# Patient Record
Sex: FEMALE | Race: WHITE | Employment: FULL TIME | URBAN - METROPOLITAN AREA
[De-identification: names, ages, dates, MRNs, and addresses within clinical notes are randomized per-mention and may not be internally consistent; named-entity substitution may affect disease eponyms.]

---

## 2017-08-14 ENCOUNTER — HOSPITAL ENCOUNTER (EMERGENCY)
Facility: CLINIC | Age: 22
Discharge: HOME | End: 2017-08-14
Attending: EMERGENCY MEDICINE | Admitting: EMERGENCY MEDICINE
Payer: COMMERCIAL

## 2017-08-14 VITALS
OXYGEN SATURATION: 97 % | BODY MASS INDEX: 30.73 KG/M2 | TEMPERATURE: 98.1 F | RESPIRATION RATE: 18 BRPM | DIASTOLIC BLOOD PRESSURE: 73 MMHG | HEART RATE: 87 BPM | HEIGHT: 64 IN | WEIGHT: 180 LBS | SYSTOLIC BLOOD PRESSURE: 113 MMHG

## 2017-08-14 DIAGNOSIS — R07.0 THROAT PAIN: ICD-10-CM

## 2017-08-14 PROCEDURE — 25000131 ZZH RX MED GY IP 250 OP 636 PS 637: Performed by: EMERGENCY MEDICINE

## 2017-08-14 PROCEDURE — 99283 EMERGENCY DEPT VISIT LOW MDM: CPT

## 2017-08-14 RX ADMIN — DEXAMETHASONE 10 MG: 2 TABLET ORAL at 01:35

## 2017-08-14 ASSESSMENT — ENCOUNTER SYMPTOMS
SHORTNESS OF BREATH: 1
TROUBLE SWALLOWING: 1

## 2017-08-14 NOTE — ED PROVIDER NOTES
"  History     Chief Complaint:  Shortness of Breath      The history is provided by the patient.      Corry Souza is a 22 year old female who presents with shortness of breath. The patient states that she woke up at midnight feeling short of breath. She states that she has a known peanut allergy but denies ingesting any peanuts or having any known exposure to peanuts. The patient did take 2 teaspoons of children's benadryl and does feel improved. She notes that it does feel difficult to swallow. The patient denies any new exposures or rashes.      Allergies:  No known drug allergies.      Medications:    Levothyroxine Sodium   Ocella  Tylenol  Ibuprofen      Past Medical History:    History reviewed.  No significant past medical history.      Past Surgical History:    Orthopedic surgery   PE tubes   Left ankle arthroscopy     Family History:    History reviewed. No pertinent family history.     Social History:  Marital Status: Single  Presents to the ED alone  Tobacco Use: Never Used  Alcohol Use: No     Review of Systems   HENT: Positive for trouble swallowing.    Respiratory: Positive for shortness of breath.    All other systems reviewed and are negative.    Physical Exam   First Vitals:  Pulse: 102  Temp: 98.1  F (36.7  C)  Resp: 18  Height: 162.6 cm (5' 4\")  Weight: 81.6 kg (180 lb)  SpO2: 100 %      Physical Exam  Constitutional: Patient appears well-developed and well-nourished. There is no acute distress.   HENT:   Head: No external signs of trauma. No lesions noted.  Eyes: PEERL, EOMI B/L, no pain or limitation of superior gaze  Ears: Normal B/L TM and external canals  Nose: Noncongested, no exudates. No rhinorrhea. No FB noted  Mouth/Throat:                         Mucous membranes are moist and normal.                         No Oropharyngeal exudate. No oropharyngeal erythema noted.                        No tonsilar swelling noted.                         No uvular deviation noted.                     "    No swelling noted on the floor of the mouth  Neck:  No posterior cervical LAD noted. FROM. Neck is supple  Cardiovascular: Normal rate at the time of my exam, regular rhythm and normal heart sounds.  Exam reveals no gallop and no friction rub.  No murmur heard. Normal peripheral pulses.  Pulmonary/Chest: Effort normal and breath sounds normal. No respiratory distress. Patient has no wheezes. Patient has no rales.   Abdominal: Soft. There is no tenderness.   Extremities: No edema noted  Neurological: Patient is alert and oriented to person, place, and time.   Skin: Skin is warm and dry. There is no diaphoresis noted. No rash noted.    Emergency Department Course   Interventions:  Decadron, 10 mg, PO     Emergency Department Course:  Nursing notes and vitals reviewed.  (0121) I performed an exam of the patient as documented above.  (0235) I rechecked the patient. Repeat exam shows no wheezing, stridor, or airway swelling. Patient reports improved throat pain as well. No signs of hives.    Findings and plan explained to the patient. Patient discharged home with instructions regarding supportive care, medications, and reasons to return. The importance of close follow-up was reviewed.    Impression & Plan    Medical Decision Making:  Corry Souza is a 22 year old female who presents thinking she had an allergic reaction. The patient is allergic to peanuts but denies any exposures. The patient took 2 teaspoons of children's benadryl at home and thought her throat scratchiness felt better. She received a dose of decadron in the ER and had complete resolution of her throat pain. Her ENT exam is unremarkable. I believe there could be an element of viral pharyngitis. I suppose an allergic reaction could be possible but I did not note any other symptoms such as hives that would be concerning for that. At this point I believe the patient is stable for discharge but I have encouraged closed primary care follow up in the  outpatient setting. Anticipatory guidance given prior to discharge.      Diagnosis:    ICD-10-CM    1. Throat pain R07.0        Disposition:  discharged to home        I, Carol Adamson, am serving as a scribe on 8/14/2017 at 1:21 AM to personally document services performed by Dr. Cormier based on my observations and the provider's statements to me.    8/14/2017   [facility] EMERGENCY DEPARTMENT       Yogesh Cormier DO  08/14/17 0249

## 2017-08-14 NOTE — ED AVS SNAPSHOT
[facility] Emergency Department    [address]    [facility] [address]    Phone:  [phone]    Fax:  [phone]                                       Corry Souza   MRN: [id number]    Department:  [facility] Emergency Department   Date of Visit:  8/14/2017           After Visit Summary Signature Page     I have received my discharge instructions, and my questions have been answered. I have discussed any challenges I see with this plan with the nurse or doctor.    ..........................................................................................................................................  Patient/Patient Representative Signature      ..........................................................................................................................................  Patient Representative Print Name and Relationship to Patient    ..................................................               ................................................  Date                                            Time    ..........................................................................................................................................  Reviewed by Signature/Title    ...................................................              ..............................................  Date                                                            Time

## 2017-08-14 NOTE — ED AVS SNAPSHOT
[facility] Emergency Department    [address]    [facility] [address]    Phone:  [phone]    Fax:  [phone]                                       Corry Souza   MRN: [id number]    Department:  [facility] Emergency Department   Date of Visit:  8/14/2017           Patient Information     Date Of Birth          1995        Your diagnoses for this visit were:     Throat pain        You were seen by Yogesh Cormier DO.      Follow-up Information     Follow up with [facility].    Specialties:  Sports Medicine, Pain Management, Obstetrics & Gynecology, Pediatrics, Internal Medicine, Nephrology    Why:  If you need to establish care with a new primary care provider    Contact information:    [address]  [facility] [address] [phone]        Follow up with [facility] Emergency Department.    Specialty:  EMERGENCY MEDICINE    Why:  If symptoms worsen    Contact information:    [address]  [facility] [address] [phone]        Discharge Instructions         Pharyngitis (Sore Throat)    You (or your child, if your child is the patient) have pharyngitis (sore throat). This infection is caused by a virus. It can cause throat pain that is worse when swallowing, aching all over, headache, and fever. The infection may be spread by coughing, kissing, or touching others after touching your mouth or nose. Antibiotic medications do not work against viruses, so they are not used for treating this condition.  Home care    If your symptoms are severe, rest at home. Return to work or school when you feel well enough.     Drink plenty of fluids to avoid dehydration.    For children: Use acetaminophen for fever, fussiness or discomfort. In infants over six months of age, you may use ibuprofen instead of acetaminophen. (NOTE: If your child has chronic liver or kidney disease or ever had  a stomach ulcer or GI bleeding, talk with your doctor before using these medicines.) (NOTE: Aspirin should never be used in anyone under 18 years of age who is ill with a fever. It may cause severe liver damage.)     For adults: You may use acetaminophen or ibuprofen to control pain or fever, unless another medicine was prescribed for this. (NOTE: If you have chronic liver or kidney disease or ever had a stomach ulcer or GI bleeding, talk with your doctor before using these medicines.)    Throat lozenges or numbing throat sprays can help reduce pain. Gargling with warm salt water will also help reduce throat pain. For this, dissolve 1/2 teaspoon of salt in 1 glass of warm water. To help soothe a sore throat, children can sip on juice or a popsicle. Children 5 years and older can also suck on a lollipop or hard candy.    Avoid salty or spicy foods, which can be irritating to the throat.  Follow-up care  Follow up with your healthcare provider or our staff if you are not improving over the next week.  When to seek medical advice  Call your healthcare provider right away if any of these occur:    Fever as directed by your doctor.  For children, seek care if:    Your child is of any age and has repeated fevers above 104 F (40 C).    Your child is younger than 2 years of age and has a fever of 100.4 F (38 C) that continues for more than 1 day.    Your child is 2 years old or older and has a fever of 100.4 F (38 C) that continues for more than 3 days.    New or worsening ear pain, sinus pain, or headache    Painful lumps in the back of neck    Stiff neck    Lymph nodes are getting larger    Inability to swallow liquids, excessive drooling, or inability to open mouth wide due to throat pain    Signs of dehydration (very dark urine or no urine, sunken eyes, dizziness)    Trouble breathing or noisy breathing    Muffled voice    New rash    Child appears to be getting sicker  Date Last Reviewed: 4/13/2015 2000-2017 [employer]. [address] [place] [address]. All rights reserved. This information is not intended as a substitute for professional medical care. Always follow your healthcare professional's instructions.          24 Hour Appointment Hotline       To make an appointment at any [facility], call [phone] ([phone]). If you don't have a family doctor or clinic, we will help you find one. [facility] are conveniently located to serve the needs of you and your family.             Review of your medicines      Our records show that you are taking the medicines listed below. If these are incorrect, please call your family doctor or clinic.        Dose / Directions Last dose taken    acetaminophen 325 MG tablet   Commonly known as:  TYLENOL   Dose:  1-2 tablet   Quantity:  250 tablet        Take 1-2 tablets by mouth every 6 hours as needed for pain.   Refills:  11        drospirenone-ethinyl estradiol 3-0.03 MG per tablet   Commonly known as:  OCELLA   Dose:  1 tablet   Quantity:  3 Package        Take 1 tablet by mouth daily.   Refills:  1        ibuprofen 200 MG tablet   Commonly known as:  ADVIL/MOTRIN   Dose:  200 mg   Quantity:  100 tablet        Take 1 tablet by mouth every 4 hours as needed for pain.   Refills:  3        LEVOTHYROXINE SODIUM PO        Refills:  0                Orders Needing Specimen Collection     None      Pending Results     No orders found from 8/12/2017 to 8/15/2017.            Pending Culture Results     No orders found from 8/12/2017 to 8/15/2017.            Pending Results Instructions     If you had any lab results that were not finalized at the time of your Discharge, you can call the ED Lab Result RN at [phone]. You will be contacted by this team for any positive Lab results or changes in treatment. The nurses are available 7 days a week from 10A to 6:30P.  You can leave a message 24 hours per day and they will return your call.        Test  Results From Your Hospital Stay               Clinical Quality Measure: Blood Pressure Screening     Your blood pressure was checked while you were in the emergency department today. The last reading we obtained was  BP: 114/69 . Please read the guidelines below about what these numbers mean and what you should do about them.  If your systolic blood pressure (the top number) is less than 120 and your diastolic blood pressure (the bottom number) is less than 80, then your blood pressure is normal. There is nothing more that you need to do about it.  If your systolic blood pressure (the top number) is 120-139 or your diastolic blood pressure (the bottom number) is 80-89, your blood pressure may be higher than it should be. You should have your blood pressure rechecked within a year by a primary care provider.  If your systolic blood pressure (the top number) is 140 or greater or your diastolic blood pressure (the bottom number) is 90 or greater, you may have high blood pressure. High blood pressure is treatable, but if left untreated over time it can put you at risk for heart attack, stroke, or kidney failure. You should have your blood pressure rechecked by a primary care provider within the next 4 weeks.  If your provider in the emergency department today gave you specific instructions to follow-up with your doctor or provider even sooner than that, you should follow that instruction and not wait for up to 4 weeks for your follow-up visit.        Thank you for choosing [facility]       Thank you for choosing [facility] for your care. Our goal is always to provide you with excellent care. Hearing back from our patients is one way we can continue to improve our services. Please take a few minutes to complete the written survey that you may receive in the mail after you visit with us. Thank you!        [employer] Information     [employer] lets you send messages to your doctor, view your test results, renew your prescriptions,  "schedule appointments and more. To sign up, go to [email or web address] . Click on \"Log in\" on the left side of the screen, which will take you to the Welcome page. Then click on \"Sign up Now\" on the right side of the page.     You will be asked to enter the access code listed below, as well as some personal information. Please follow the directions to create your username and password.     Your access code is: [email or web address]  Expires: 2017  1:31 AM     Your access code will  in 90 days. If you need help or a new code, please call your [facility] clinic or [phone].        Care EveryWhere ID     This is your Care EveryWhere ID. This could be used by other organizations to access your [facility] medical records  [id number]        Equal Access to Services     JESI ANDREWS : Hadtino Munoz, wavaishali roberto, xuan mercado, joie harrison . So [facility] [phone].    ATENCIÓN: Si habla español, tiene a beckett disposición servicios gratuitos de asistencia lingüística. Llame al [phone].    We comply with applicable federal civil rights laws and [place] laws. We do not discriminate on the basis of race, color, national origin, age, disability sex, sexual orientation or gender identity.            After Visit Summary       This is your record. Keep this with you and show to your community pharmacist(s) and doctor(s) at your next visit.                  "

## 2017-08-14 NOTE — ED NOTES
Pt was sleeping and awakened with a sense of SOB pt also felt like it was difff to swallow, pt has a peanut allergy but denies any intake, took  2 tsp of childrens benadryl

## 2017-08-14 NOTE — DISCHARGE INSTRUCTIONS
Pharyngitis (Sore Throat)    You (or your child, if your child is the patient) have pharyngitis (sore throat). This infection is caused by a virus. It can cause throat pain that is worse when swallowing, aching all over, headache, and fever. The infection may be spread by coughing, kissing, or touching others after touching your mouth or nose. Antibiotic medications do not work against viruses, so they are not used for treating this condition.  Home care    If your symptoms are severe, rest at home. Return to work or school when you feel well enough.     Drink plenty of fluids to avoid dehydration.    For children: Use acetaminophen for fever, fussiness or discomfort. In infants over six months of age, you may use ibuprofen instead of acetaminophen. (NOTE: If your child has chronic liver or kidney disease or ever had a stomach ulcer or GI bleeding, talk with your doctor before using these medicines.) (NOTE: Aspirin should never be used in anyone under 18 years of age who is ill with a fever. It may cause severe liver damage.)     For adults: You may use acetaminophen or ibuprofen to control pain or fever, unless another medicine was prescribed for this. (NOTE: If you have chronic liver or kidney disease or ever had a stomach ulcer or GI bleeding, talk with your doctor before using these medicines.)    Throat lozenges or numbing throat sprays can help reduce pain. Gargling with warm salt water will also help reduce throat pain. For this, dissolve 1/2 teaspoon of salt in 1 glass of warm water. To help soothe a sore throat, children can sip on juice or a popsicle. Children 5 years and older can also suck on a lollipop or hard candy.    Avoid salty or spicy foods, which can be irritating to the throat.  Follow-up care  Follow up with your healthcare provider or our staff if you are not improving over the next week.  When to seek medical advice  Call your healthcare provider right away if any of these  occur:    Fever as directed by your doctor.  For children, seek care if:    Your child is of any age and has repeated fevers above 104 F (40 C).    Your child is younger than 2 years of age and has a fever of 100.4 F (38 C) that continues for more than 1 day.    Your child is 2 years old or older and has a fever of 100.4 F (38 C) that continues for more than 3 days.    New or worsening ear pain, sinus pain, or headache    Painful lumps in the back of neck    Stiff neck    Lymph nodes are getting larger    Inability to swallow liquids, excessive drooling, or inability to open mouth wide due to throat pain    Signs of dehydration (very dark urine or no urine, sunken eyes, dizziness)    Trouble breathing or noisy breathing    Muffled voice    New rash    Child appears to be getting sicker  Date Last Reviewed: 4/13/2015 2000-2017 [employer]. [address] [place] [address], [place] [address]. All rights reserved. This information is not intended as a substitute for professional medical care. Always follow your healthcare professional's instructions.

## 2017-08-14 NOTE — ED NOTES
Pt stated still hard to swallow but feels improved, now c/o dizziness otherwise has no other complaints, VS remains stable, will continue to monitor.

## 2018-09-23 ENCOUNTER — HOSPITAL ENCOUNTER (EMERGENCY)
Facility: CLINIC | Age: 23
Discharge: HOME | End: 2018-09-24
Attending: EMERGENCY MEDICINE | Admitting: EMERGENCY MEDICINE

## 2018-09-23 DIAGNOSIS — R19.7 DIARRHEA OF PRESUMED INFECTIOUS ORIGIN: ICD-10-CM

## 2018-09-23 LAB
ALBUMIN SERPL-MCNC: 4.2 G/DL (ref 3.4–5)
ALP SERPL-CCNC: 59 U/L (ref 40–150)
ALT SERPL W P-5'-P-CCNC: 26 U/L (ref 0–50)
ANION GAP SERPL CALCULATED.3IONS-SCNC: 6 MMOL/L (ref 3–14)
AST SERPL W P-5'-P-CCNC: 18 U/L (ref 0–45)
BASOPHILS # BLD AUTO: 0.1 10E9/L (ref 0–0.2)
BASOPHILS NFR BLD AUTO: 0.5 %
BILIRUB SERPL-MCNC: 0.2 MG/DL (ref 0.2–1.3)
BUN SERPL-MCNC: 12 MG/DL (ref 7–30)
CALCIUM SERPL-MCNC: 8.9 MG/DL (ref 8.5–10.1)
CHLORIDE SERPL-SCNC: 107 MMOL/L (ref 94–109)
CO2 SERPL-SCNC: 28 MMOL/L (ref 20–32)
CREAT SERPL-MCNC: 1.01 MG/DL (ref 0.52–1.04)
DIFFERENTIAL METHOD BLD: NORMAL
EOSINOPHIL # BLD AUTO: 0.1 10E9/L (ref 0–0.7)
EOSINOPHIL NFR BLD AUTO: 1.3 %
ERYTHROCYTE [DISTWIDTH] IN BLOOD BY AUTOMATED COUNT: 12.3 % (ref 10–15)
GFR SERPL CREATININE-BSD FRML MDRD: 66 ML/MIN/1.7M2
GLUCOSE SERPL-MCNC: 119 MG/DL (ref 70–99)
HCG SERPL QL: NEGATIVE
HCT VFR BLD AUTO: 40.2 % (ref 35–47)
HEMOCCULT STL QL: POSITIVE
HGB BLD-MCNC: 13.6 G/DL (ref 11.7–15.7)
IMM GRANULOCYTES # BLD: 0 10E9/L (ref 0–0.4)
IMM GRANULOCYTES NFR BLD: 0.3 %
LACTOFERRIN STL QL IA: POSITIVE
LYMPHOCYTES # BLD AUTO: 3.3 10E9/L (ref 0.8–5.3)
LYMPHOCYTES NFR BLD AUTO: 34.6 %
MCH RBC QN AUTO: 30.7 PG (ref 26.5–33)
MCHC RBC AUTO-ENTMCNC: 33.8 G/DL (ref 31.5–36.5)
MCV RBC AUTO: 91 FL (ref 78–100)
MONOCYTES # BLD AUTO: 0.7 10E9/L (ref 0–1.3)
MONOCYTES NFR BLD AUTO: 6.9 %
NEUTROPHILS # BLD AUTO: 5.4 10E9/L (ref 1.6–8.3)
NEUTROPHILS NFR BLD AUTO: 56.4 %
NRBC # BLD AUTO: 0 10*3/UL
NRBC BLD AUTO-RTO: 0 /100
PLATELET # BLD AUTO: 242 10E9/L (ref 150–450)
POTASSIUM SERPL-SCNC: 3.6 MMOL/L (ref 3.4–5.3)
PROT SERPL-MCNC: 7.8 G/DL (ref 6.8–8.8)
RBC # BLD AUTO: 4.43 10E12/L (ref 3.8–5.2)
SODIUM SERPL-SCNC: 141 MMOL/L (ref 133–144)
WBC # BLD AUTO: 9.5 10E9/L (ref 4–11)

## 2018-09-23 PROCEDURE — 87506 IADNA-DNA/RNA PROBE TQ 6-11: CPT | Performed by: EMERGENCY MEDICINE

## 2018-09-23 PROCEDURE — 80053 COMPREHEN METABOLIC PANEL: CPT | Performed by: EMERGENCY MEDICINE

## 2018-09-23 PROCEDURE — 82272 OCCULT BLD FECES 1-3 TESTS: CPT | Performed by: EMERGENCY MEDICINE

## 2018-09-23 PROCEDURE — 96375 TX/PRO/DX INJ NEW DRUG ADDON: CPT

## 2018-09-23 PROCEDURE — 83630 LACTOFERRIN FECAL (QUAL): CPT | Performed by: EMERGENCY MEDICINE

## 2018-09-23 PROCEDURE — 99284 EMERGENCY DEPT VISIT MOD MDM: CPT | Mod: 25

## 2018-09-23 PROCEDURE — 87493 C DIFF AMPLIFIED PROBE: CPT | Performed by: EMERGENCY MEDICINE

## 2018-09-23 PROCEDURE — 85025 COMPLETE CBC W/AUTO DIFF WBC: CPT | Performed by: EMERGENCY MEDICINE

## 2018-09-23 PROCEDURE — 84703 CHORIONIC GONADOTROPIN ASSAY: CPT | Performed by: EMERGENCY MEDICINE

## 2018-09-23 PROCEDURE — 25000128 H RX IP 250 OP 636: Performed by: EMERGENCY MEDICINE

## 2018-09-23 PROCEDURE — 96361 HYDRATE IV INFUSION ADD-ON: CPT

## 2018-09-23 PROCEDURE — 96374 THER/PROPH/DIAG INJ IV PUSH: CPT

## 2018-09-23 RX ORDER — SODIUM CHLORIDE 9 MG/ML
1000 INJECTION, SOLUTION INTRAVENOUS CONTINUOUS
Status: DISCONTINUED | OUTPATIENT
Start: 2018-09-23 | End: 2018-09-23

## 2018-09-23 RX ORDER — KETOROLAC TROMETHAMINE 15 MG/ML
15 INJECTION, SOLUTION INTRAMUSCULAR; INTRAVENOUS ONCE
Status: COMPLETED | OUTPATIENT
Start: 2018-09-23 | End: 2018-09-23

## 2018-09-23 RX ORDER — ONDANSETRON 2 MG/ML
4 INJECTION INTRAMUSCULAR; INTRAVENOUS
Status: COMPLETED | OUTPATIENT
Start: 2018-09-23 | End: 2018-09-23

## 2018-09-23 RX ADMIN — SODIUM CHLORIDE, POTASSIUM CHLORIDE, SODIUM LACTATE AND CALCIUM CHLORIDE 1000 ML: 600; 310; 30; 20 INJECTION, SOLUTION INTRAVENOUS at 23:40

## 2018-09-23 RX ADMIN — KETOROLAC TROMETHAMINE 15 MG: 15 INJECTION, SOLUTION INTRAMUSCULAR; INTRAVENOUS at 22:39

## 2018-09-23 RX ADMIN — ONDANSETRON 4 MG: 2 INJECTION INTRAMUSCULAR; INTRAVENOUS at 22:39

## 2018-09-23 RX ADMIN — SODIUM CHLORIDE, POTASSIUM CHLORIDE, SODIUM LACTATE AND CALCIUM CHLORIDE 1000 ML: 600; 310; 30; 20 INJECTION, SOLUTION INTRAVENOUS at 22:37

## 2018-09-23 NOTE — ED AVS SNAPSHOT
[facility] Emergency Department    [address]    [facility] [address]    Phone:  [phone]    Fax:  [phone]                                       Corry Souza   MRN: [id number]    Department:  [facility] Emergency Department   Date of Visit:  9/23/2018           After Visit Summary Signature Page     I have received my discharge instructions, and my questions have been answered. I have discussed any challenges I see with this plan with the nurse or doctor.    ..........................................................................................................................................  Patient/Patient Representative Signature      ..........................................................................................................................................  Patient Representative Print Name and Relationship to Patient    ..................................................               ................................................  Date                                   Time    ..........................................................................................................................................  Reviewed by Signature/Title    ...................................................              ..............................................  Date                                               Time          22EPIC Rev 08/18

## 2018-09-23 NOTE — ED AVS SNAPSHOT
[facility] Emergency Department    [address] [place] [address]    Phone:  [phone]    Fax:  [phone]                                       Corry Souza   MRN: [id number]    Department:  [facility] Emergency Department   Date of Visit:  9/23/2018           Patient Information     Date Of Birth          1995        Your diagnoses for this visit were:     Diarrhea of presumed infectious origin        You were seen by Bautista Nettles MD.      Follow-up Information     Follow up with Norma Butler NP In 2 days.    Specialty:  Family Practice    Contact information:    [facility]  [address] [facility] [id number] [phone]          Discharge Instructions       Discharge Instructions  Adult Diarrhea    You have been seen today for diarrhea. This is usually caused by a virus, but some bacteria, parasites, medicines or other medical conditions can cause similar symptoms. At this time your doctor does not find that your diarrhea is a sign of anything dangerous or life-threatening. However, sometimes the signs of serious illness do not show up right away. If you have new or worse symptoms, you may need to be seen again in the Emergency Department or by your primary doctor.     Return to the Emergency Department if:    You feel you are getting dehydrated, such as being very thirsty, not urinating at least every 8-12 hours, or feeling faint or lightheaded.     You develop a new fever, or your fever continues for more than 2 days.     You have belly pain that seems worse than cramps, is in one spot, or is getting worse over time.     You have blood in your stool or your stool becomes black.  (Remember that if you take Pepto-Bismol , this will turn your stool black).     You feel very weak.    You are not starting to improve within 24 hours of your visit here.    What can I do to help myself?    The most important thing to do is to drink clear  "liquids.   It is best to have only small, frequent sips of liquids. Drinking too much at once may cause more diarrhea. You should also replace minerals, sodium and potassium lost with diarrhea. Pedialyte  and sports drinks can help you replace these minerals. You can also drink clear liquids such as water, weak tea, apple juice, and 7-Up . Avoid acid liquids (orange), caffeine (coffee) or alcohol. Milk products will make the diarrhea worse.     Eat only bland foods. Soda crackers, toast, plain noodles, gelatin, applesauce and bananas are good first choices. Avoid foods that have acid, are spicy, fatty or fibrous (such as meats, coarse grains, vegetables). You may start eating these foods again in about 3 days when you are better.     Sometimes treatment includes prescription medicine to prevent diarrhea. If your doctor prescribes these for you, take them as directed.     Nonprescription medicine is available for the treatment of diarrhea and can be very effective. If you use it, make sure you use the dose recommended on the package. Check with your healthcare provider before you use any medicine for diarrhea.     Don t take ibuprofen, or other nonsteroidal anti-inflammatory medicines without checking with your healthcare provider.   Probiotics: If you have been given an antibiotic, you may want to also take a probiotic pill or eat yogurt with live cultures. Probiotics have \"good bacteria\" to help your intestines stay healthy. Studies have shown that probiotics help prevent diarrhea and other intestine problems (including C. diff infection) when you take antibiotics. You can buy these without a prescription in the pharmacy section of the store.   If you were given a prescription for medicine here today, be sure to read all of the information (including the package insert) that comes with your prescription.  This will include important information about the medicine, its side effects, and any warnings that you need to " know about.  The pharmacist who fills the prescription can provide more information and answer questions you may have about the medicine.  If you have questions or concerns that the pharmacist cannot address, please call or return to the Emergency Department.   Opioid Medication Information    Pain medications are among the most commonly prescribed medicines, so we are including this information for all our patients. If you did not receive pain medication or get a prescription for pain medicine, you can ignore it.     You may have been given a prescription for an opioid (narcotic) pain medicine and/or have received a pain medicine while here in the Emergency Department. These medicines can make you drowsy or impaired. You must not drive, operate dangerous equipment, or engage in any other dangerous activities while taking these medications. If you drive while taking these medications, you could be arrested for DUI, or driving under the influence. Do not drink any alcohol while you are taking these medications.     Opioid pain medications can cause addiction. If you have a history of chemical dependency of any type, you are at a higher risk of becoming addicted to pain medications.  Only take these prescribed medications to treat your pain when all other options have been tried. Take it for as short a time and as few doses as possible. Store your pain pills in a secure place, as they are frequently stolen and provide a dangerous opportunity for children or visitors in your house to start abusing these powerful medications. We will not replace any lost or stolen medicine.  As soon as your pain is better, you should flush all your remaining medication.     Many prescription pain medications contain Tylenol  (acetaminophen), including Vicodin , Tylenol #3 , Norco , Lortab , and Percocet .  You should not take any extra pills of Tylenol  if you are using these prescription medications or you can get very sick.  Do not  ever take more than 3000 mg of acetaminophen in any 24 hour period.    All opioids tend to cause constipation. Drink plenty of water and eat foods that have a lot of fiber, such as fruits, vegetables, prune juice, apple juice and high fiber cereal.  Take a laxative if you don t move your bowels at least every other day. Miralax , Milk of Magnesia, Colace , or Senna  can be used to keep you regular.      Remember that you can always come back to the Emergency Department if you are not able to see your regular doctor in the amount of time listed above, if you get any new symptoms, or if there is anything that worries you.        24 Hour Appointment Hotline       To make an appointment at any [facility], call [phone] ([phone]). If you don't have a family doctor or clinic, we will help you find one. [facility] clinics are conveniently located to serve the needs of you and your family.             Review of your medicines      Our records show that you are taking the medicines listed below. If these are incorrect, please call your family doctor or clinic.        Dose / Directions Last dose taken    acetaminophen 325 MG tablet   Commonly known as:  TYLENOL   Dose:  1-2 tablet   Quantity:  250 tablet        Take 1-2 tablets by mouth every 6 hours as needed for pain.   Refills:  11        drospirenone-ethinyl estradiol 3-0.03 MG per tablet   Commonly known as:  OCELLA   Dose:  1 tablet   Quantity:  3 Package        Take 1 tablet by mouth daily.   Refills:  1        ibuprofen 200 MG tablet   Commonly known as:  ADVIL/MOTRIN   Dose:  200 mg   Quantity:  100 tablet        Take 1 tablet by mouth every 4 hours as needed for pain.   Refills:  3        LEVOTHYROXINE SODIUM PO        Refills:  0                Procedures and tests performed during your visit     CBC with platelets differential    Clostridium difficile toxin B PCR    Comprehensive metabolic panel    Enteric Bacteria and Virus Panel by ELIESER Stool     Fecal Lactoferrin    HCG qualitative    Occult blood stool      Orders Needing Specimen Collection     None      Pending Results     Date and Time Order Name Status Description    9/23/2018 2231 Clostridium difficile toxin B PCR In process     9/23/2018 2231 Enteric Bacteria and Virus Panel by ELIESER Stool In process             Pending Culture Results     Date and Time Order Name Status Description    9/23/2018 2231 Clostridium difficile toxin B PCR In process     9/23/2018 2231 Enteric Bacteria and Virus Panel by ELIESER Stool In process             Pending Results Instructions     If you had any lab results that were not finalized at the time of your Discharge, you can call the ED Lab Result RN at [phone]. You will be contacted by this team for any positive Lab results or changes in treatment. The nurses are available 7 days a week from 10A to 6:30P.  You can leave a message 24 hours per day and they will return your call.        Test Results From Your Hospital Stay        9/23/2018 10:21 PM      Component Results     Component Value Ref Range & Units Status    WBC 9.5 4.0 - 11.0 10e9/L Final    RBC Count 4.43 3.8 - 5.2 10e12/L Final    Hemoglobin 13.6 11.7 - 15.7 g/dL Final    Hematocrit 40.2 35.0 - 47.0 % Final    MCV 91 78 - 100 fl Final    MCH 30.7 26.5 - 33.0 pg Final    MCHC 33.8 31.5 - 36.5 g/dL Final    RDW 12.3 10.0 - 15.0 % Final    Platelet Count 242 150 - 450 10e9/L Final    Diff Method Automated Method  Final    % Neutrophils 56.4 % Final    % Lymphocytes 34.6 % Final    % Monocytes 6.9 % Final    % Eosinophils 1.3 % Final    % Basophils 0.5 % Final    % Immature Granulocytes 0.3 % Final    Nucleated RBCs 0 0 /100 Final    Absolute Neutrophil 5.4 1.6 - 8.3 10e9/L Final    Absolute Lymphocytes 3.3 0.8 - 5.3 10e9/L Final    Absolute Monocytes 0.7 0.0 - 1.3 10e9/L Final    Absolute Eosinophils 0.1 0.0 - 0.7 10e9/L Final    Absolute Basophils 0.1 0.0 - 0.2 10e9/L Final    Abs Immature Granulocytes 0.0 0 -  0.4 10e9/L Final    Absolute Nucleated RBC 0.0  Final         9/23/2018 10:38 PM      Component Results     Component Value Ref Range & Units Status    Sodium 141 133 - 144 mmol/L Final    Potassium 3.6 3.4 - 5.3 mmol/L Final    Chloride 107 94 - 109 mmol/L Final    Carbon Dioxide 28 20 - 32 mmol/L Final    Anion Gap 6 3 - 14 mmol/L Final    Glucose 119 (H) 70 - 99 mg/dL Final    Urea Nitrogen 12 7 - 30 mg/dL Final    Creatinine 1.01 0.52 - 1.04 mg/dL Final    GFR Estimate 66 >60 mL/min/1.7m2 Final    Non  GFR Calc    GFR Estimate If Black 80 >60 mL/min/1.7m2 Final    African American GFR Calc    Calcium 8.9 8.5 - 10.1 mg/dL Final    Bilirubin Total 0.2 0.2 - 1.3 mg/dL Final    Albumin 4.2 3.4 - 5.0 g/dL Final    Protein Total 7.8 6.8 - 8.8 g/dL Final    Alkaline Phosphatase 59 40 - 150 U/L Final    ALT 26 0 - 50 U/L Final    AST 18 0 - 45 U/L Final         9/23/2018 11:05 PM      Component Results     Component Value Ref Range & Units Status    Occult Blood Positive (A) NEG^Negative Final         9/23/2018 10:41 PM         9/23/2018 11:04 PM      Component Results     Component Value Ref Range & Units Status    Fecal Lactoferrin Positive (A) NEG^Negative Final    Test not valid for breast fed patients.         9/23/2018 10:41 PM         9/23/2018 10:55 PM      Component Results     Component Value Ref Range & Units Status    HCG Qualitative Serum Negative NEG^Negative Final    This test is for screening purposes.  Results should be interpreted along with   the clinical picture.  Confirmation testing is available if warranted by   ordering QUN803, HCG Quantitative Pregnancy.                  Clinical Quality Measure: Blood Pressure Screening     Your blood pressure was checked while you were in the emergency department today. The last reading we obtained was  BP: 117/73 . Please read the guidelines below about what these numbers mean and what you should do about them.  If your systolic blood pressure  "(the top number) is less than 120 and your diastolic blood pressure (the bottom number) is less than 80, then your blood pressure is normal. There is nothing more that you need to do about it.  If your systolic blood pressure (the top number) is 120-139 or your diastolic blood pressure (the bottom number) is 80-89, your blood pressure may be higher than it should be. You should have your blood pressure rechecked within a year by a primary care provider.  If your systolic blood pressure (the top number) is 140 or greater or your diastolic blood pressure (the bottom number) is 90 or greater, you may have high blood pressure. High blood pressure is treatable, but if left untreated over time it can put you at risk for heart attack, stroke, or kidney failure. You should have your blood pressure rechecked by a primary care provider within the next 4 weeks.  If your provider in the emergency department today gave you specific instructions to follow-up with your doctor or provider even sooner than that, you should follow that instruction and not wait for up to 4 weeks for your follow-up visit.        Thank you for choosing [facility]       Thank you for choosing [facility] for your care. Our goal is always to provide you with excellent care. Hearing back from our patients is one way we can continue to improve our services. Please take a few minutes to complete the written survey that you may receive in the mail after you visit with us. Thank you!        [employer] Information     [employer] lets you send messages to your doctor, view your test results, renew your prescriptions, schedule appointments and more. To sign up, go to [email or web address] . Click on \"Log in\" on the left side of the screen, which will take you to the Welcome page. Then click on \"Sign up Now\" on the right side of the page.     You will be asked to enter the access code listed below, as well as some personal information. Please follow the directions to " create your username and password.     Your access code is: [email or web address]  Expires: 2018 12:28 AM     Your access code will  in 90 days. If you need help or a new code, please call your [facility] clinic or [phone].        Care EveryWhere ID     This is your Care EveryWhere ID. This could be used by other organizations to access your [facility] medical records  [id number]        Equal Access to Services     [facility] : Hadii cesar harmon hadasho Sotitoali, waaxda luqadaha, qaybta kaalmada adeegyada, joie harrison . So [facility] [phone].    ATENCIÓN: Si habla español, tiene a beckett disposición servicios gratuitos de asistencia lingüística. Llame al [phone].    We comply with applicable federal civil rights laws and [place] laws. We do not discriminate on the basis of race, color, national origin, age, disability, sex, sexual orientation, or gender identity.            After Visit Summary       This is your record. Keep this with you and show to your community pharmacist(s) and doctor(s) at your next visit.

## 2018-09-24 VITALS
HEART RATE: 81 BPM | SYSTOLIC BLOOD PRESSURE: 128 MMHG | BODY MASS INDEX: 30.73 KG/M2 | TEMPERATURE: 97.6 F | RESPIRATION RATE: 14 BRPM | WEIGHT: 180 LBS | HEIGHT: 64 IN | OXYGEN SATURATION: 97 % | DIASTOLIC BLOOD PRESSURE: 76 MMHG

## 2018-09-24 LAB
C COLI+JEJUNI+LARI FUSA STL QL NAA+PROBE: NOT DETECTED
C DIFF TOX B STL QL: NEGATIVE
EC STX1 GENE STL QL NAA+PROBE: NOT DETECTED
EC STX2 GENE STL QL NAA+PROBE: NOT DETECTED
ENTERIC PATHOGEN COMMENT: NORMAL
NOROV GI+II ORF1-ORF2 JNC STL QL NAA+PR: NOT DETECTED
RVA NSP5 STL QL NAA+PROBE: NOT DETECTED
SALMONELLA SP RPOD STL QL NAA+PROBE: NOT DETECTED
SHIGELLA SP+EIEC IPAH STL QL NAA+PROBE: NOT DETECTED
SPECIMEN SOURCE: NORMAL
V CHOL+PARA RFBL+TRKH+TNAA STL QL NAA+PR: NOT DETECTED
Y ENTERO RECN STL QL NAA+PROBE: NOT DETECTED

## 2018-09-24 NOTE — ED TRIAGE NOTES
Patient complaining of diarrhea since Wednesday.  Tried imodium and the BRAT diet with no relief.  Now noting blood tinged stool.  Skin around rectum red per patient.      Also complaining of LLQ pain that started today.      ABCs intact.  Alert and oriented x 3.

## 2018-09-25 NOTE — ED PROVIDER NOTES
History     Chief Complaint:    Diarrhea and Rectal Bleeding      HPI   Corry Souza is a 23 year old female who presents with patient had diarrhea for 4-5 days now.  Just recently she states that she gets very small amount of streaked blood in the stool and in the last 24 hours she states she has had 15-20 watery stools.  She denies being on any antibiotics in the last 6-8 weeks nor she had any overseas travel.  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  Nobody is sick at home.  She is never had a history of C. difficile enterocolitis.  She denies any fever or chills.  She has had no vomiting.  She only has nausea when she is having a bowel movement but it resolves.  She is trying to eat solids but it makes things worse.  She has some cramping before she has a bowel movement but it resolves after the bowel movement.  She has had no urinary symptoms denies any cough, shortness of breath, nor any chest pain.    Allergies:    Allergies   Allergen Reactions     Peanuts [Nuts]         Medications:        acetaminophen (TYLENOL) 325 MG tablet   drospirenone-ethinyl estradiol (OCELLA) 3-0.03 MG per tablet   ibuprofen (ADVIL,MOTRIN) 200 MG tablet   LEVOTHYROXINE SODIUM PO       Problem List:      Patient Active Problem List    Diagnosis Date Noted     Irregular menstrual bleeding 08/21/2008     Priority: Medium     long hx of Ankle Pain 07/21/2008     Priority: Medium     Contact dermatitis and other eczema, due to unspecified cause 08/16/2005     Priority: Medium     Eczema.       Urticaria 05/05/2005     Priority: Medium     Problem list name updated by automated process. Provider to review          Past Medical History:      Past Medical History:   Diagnosis Date     NO ACTIVE PROBLEMS      Thyroid disease        Past Surgical History:      Past Surgical History:   Procedure Laterality Date     ORTHOPEDIC SURGERY       SURGICAL HISTORY OF -   11/1998    PE Tubes     SURGICAL HISTORY OF -   09/2007    Left ankle  "arthroscopy with loose body removal     SURGICAL HISTORY OF -   6/11/2008    L ankle arthroscopy with arthroscopic debridement and microfracture of the left talus (Left talus osteochondral lesion.)       Family History:      Family History   Problem Relation Age of Onset     HEART DISEASE Paternal Grandfather        Social History:    Marital Status:  Single [1]  Social History   Substance Use Topics     Smoking status: Passive Smoke Exposure - Never Smoker     Smokeless tobacco: Not on file     Alcohol use No        Review of Systems  See HPI all other systems negative   Physical Exam   First Vitals:  BP: 139/90  Pulse: 81  Temp: 97.6  F (36.4  C)  Resp: 14  Height: 162.6 cm (5' 4\")  Weight: 81.6 kg (180 lb)  SpO2: 99 %      Physical Exam  General:           Dry oral mucosa             HEENT:   The scalp and head appear normal    The pupils are equal, round, and reactive to light    Extraocular muscles are intact.    The nose is normal.    The oropharynx is normal.      Uvula is in the midline.    Neck:  Normal range of motion.    Lungs:  Clear.      No rales, no wheezing.      There is no tachypnea.  Non-labored.  Cardiac: Regular rate.      Normal S1 and S2.      No pathological murmur/rub    Abdomen: Soft. No distension, no localized tenderness or rebound.  MS:  Normal tone. Normal movement of all extremities.   Neurologic:     Normal mentation.  No cranial nerve deficits.  No focal motor or sensory                            changes.      Speech normal.  Psych:  Awake.     Alert.      Normal affect.      Appropriate interactions.  Skin:  No rash.      No lesions.        Emergency Department Course   ECG:          Laboratory:  Labs Ordered and Resulted from Time of ED Arrival Up to the Time of Departure from the ED   COMPREHENSIVE METABOLIC PANEL - Abnormal; Notable for the following:        Result Value    Glucose 119 (*)     All other components within normal limits   OCCULT BLOOD STOOL - Abnormal; Notable for " the following:     Occult Blood Positive (*)     All other components within normal limits   FECAL LACTOFERRIN - Abnormal; Notable for the following:     Fecal Lactoferrin Positive (*)     All other components within normal limits   CBC WITH PLATELETS DIFFERENTIAL   HCG QUALITATIVE         No orders to display               ED Course       Impression & Plan       {       Medical Decision Making:  Patient is a 23-year-old female who works in a nursing care facility around sick people and is at increased risk for C. difficile enteric colitis.  Stool studies are pending.  She was positive for fecal lactoferrin as well as occult blood.  She felt improved after 2 L of normal saline and Toradol.  She drank 8 ounces of fluids had no more diarrhea with that and is discharged home with instructions to be on clear liquids only over the next 24 hours.  We discussed electrolyte replacement as well.  She should follow-up with her PCP in 48 hours they can follow-up on the stool studies at that time and see how she is doing.  In 24 hours if her diarrhea has slowed down she can advance cautiously to a brat diet    Diagnosis:    ICD-10-CM    1. Diarrhea of presumed infectious origin A09 Enteric Bacteria and Virus Panel by ELIESER Stool     Clostridium difficile toxin B PCR       Disposition:  discharged to home    Discharge Medications:  Discharge Medication List as of 9/24/2018 12:28 AM            Bautista Nettels  9/23/2018   [facility] EMERGENCY DEPARTMENT       Bautista Nettles MD  09/24/18 1931

## 2020-01-24 ENCOUNTER — HOSPITAL ENCOUNTER (EMERGENCY)
Facility: CLINIC | Age: 25
Discharge: HOME | End: 2020-01-25
Attending: PHYSICIAN ASSISTANT | Admitting: PHYSICIAN ASSISTANT
Payer: COMMERCIAL

## 2020-01-24 ENCOUNTER — APPOINTMENT (OUTPATIENT)
Dept: CT IMAGING | Facility: CLINIC | Age: 25
End: 2020-01-24
Attending: PHYSICIAN ASSISTANT
Payer: COMMERCIAL

## 2020-01-24 VITALS
WEIGHT: 192 LBS | HEART RATE: 101 BPM | RESPIRATION RATE: 18 BRPM | OXYGEN SATURATION: 99 % | SYSTOLIC BLOOD PRESSURE: 143 MMHG | TEMPERATURE: 97.7 F | DIASTOLIC BLOOD PRESSURE: 99 MMHG | BODY MASS INDEX: 32.96 KG/M2

## 2020-01-24 DIAGNOSIS — K64.4 EXTERNAL HEMORRHOIDS: ICD-10-CM

## 2020-01-24 DIAGNOSIS — R10.32 LLQ ABDOMINAL PAIN: ICD-10-CM

## 2020-01-24 DIAGNOSIS — N89.8 VAGINAL DISCHARGE: ICD-10-CM

## 2020-01-24 LAB
ANION GAP SERPL CALCULATED.3IONS-SCNC: 6 MMOL/L (ref 3–14)
B-HCG FREE SERPL-ACNC: <5 IU/L
BASOPHILS # BLD AUTO: 0.1 10E9/L (ref 0–0.2)
BASOPHILS NFR BLD AUTO: 0.5 %
BUN SERPL-MCNC: 16 MG/DL (ref 7–30)
CALCIUM SERPL-MCNC: 9.2 MG/DL (ref 8.5–10.1)
CHLORIDE SERPL-SCNC: 105 MMOL/L (ref 94–109)
CO2 SERPL-SCNC: 27 MMOL/L (ref 20–32)
CREAT SERPL-MCNC: 0.87 MG/DL (ref 0.52–1.04)
DIFFERENTIAL METHOD BLD: NORMAL
EOSINOPHIL # BLD AUTO: 0.2 10E9/L (ref 0–0.7)
EOSINOPHIL NFR BLD AUTO: 1.6 %
ERYTHROCYTE [DISTWIDTH] IN BLOOD BY AUTOMATED COUNT: 12.2 % (ref 10–15)
GFR SERPL CREATININE-BSD FRML MDRD: >90 ML/MIN/{1.73_M2}
GLUCOSE SERPL-MCNC: 95 MG/DL (ref 70–99)
HCT VFR BLD AUTO: 40.9 % (ref 35–47)
HGB BLD-MCNC: 13.5 G/DL (ref 11.7–15.7)
IMM GRANULOCYTES # BLD: 0 10E9/L (ref 0–0.4)
IMM GRANULOCYTES NFR BLD: 0.3 %
LYMPHOCYTES # BLD AUTO: 3.1 10E9/L (ref 0.8–5.3)
LYMPHOCYTES NFR BLD AUTO: 30.4 %
MCH RBC QN AUTO: 30.1 PG (ref 26.5–33)
MCHC RBC AUTO-ENTMCNC: 33 G/DL (ref 31.5–36.5)
MCV RBC AUTO: 91 FL (ref 78–100)
MONOCYTES # BLD AUTO: 0.7 10E9/L (ref 0–1.3)
MONOCYTES NFR BLD AUTO: 6.7 %
NEUTROPHILS # BLD AUTO: 6.2 10E9/L (ref 1.6–8.3)
NEUTROPHILS NFR BLD AUTO: 60.5 %
NRBC # BLD AUTO: 0 10*3/UL
NRBC BLD AUTO-RTO: 0 /100
PLATELET # BLD AUTO: 239 10E9/L (ref 150–450)
POTASSIUM SERPL-SCNC: 3.8 MMOL/L (ref 3.4–5.3)
RBC # BLD AUTO: 4.48 10E12/L (ref 3.8–5.2)
SODIUM SERPL-SCNC: 138 MMOL/L (ref 133–144)
SPECIMEN SOURCE: NORMAL
WBC # BLD AUTO: 10.2 10E9/L (ref 4–11)
WET PREP SPEC: NORMAL

## 2020-01-24 PROCEDURE — 84702 CHORIONIC GONADOTROPIN TEST: CPT

## 2020-01-24 PROCEDURE — 87210 SMEAR WET MOUNT SALINE/INK: CPT | Performed by: PHYSICIAN ASSISTANT

## 2020-01-24 PROCEDURE — 85025 COMPLETE CBC W/AUTO DIFF WBC: CPT | Performed by: PHYSICIAN ASSISTANT

## 2020-01-24 PROCEDURE — 87591 N.GONORRHOEAE DNA AMP PROB: CPT | Performed by: PHYSICIAN ASSISTANT

## 2020-01-24 PROCEDURE — 25000132 ZZH RX MED GY IP 250 OP 250 PS 637: Performed by: PHYSICIAN ASSISTANT

## 2020-01-24 PROCEDURE — 99285 EMERGENCY DEPT VISIT HI MDM: CPT | Mod: 25

## 2020-01-24 PROCEDURE — 80048 BASIC METABOLIC PNL TOTAL CA: CPT | Performed by: PHYSICIAN ASSISTANT

## 2020-01-24 PROCEDURE — 25000128 H RX IP 250 OP 636: Performed by: PHYSICIAN ASSISTANT

## 2020-01-24 PROCEDURE — 96375 TX/PRO/DX INJ NEW DRUG ADDON: CPT

## 2020-01-24 PROCEDURE — 25000125 ZZHC RX 250: Performed by: PHYSICIAN ASSISTANT

## 2020-01-24 PROCEDURE — 87491 CHLMYD TRACH DNA AMP PROBE: CPT | Performed by: PHYSICIAN ASSISTANT

## 2020-01-24 PROCEDURE — 74177 CT ABD & PELVIS W/CONTRAST: CPT

## 2020-01-24 PROCEDURE — 96374 THER/PROPH/DIAG INJ IV PUSH: CPT | Mod: 59

## 2020-01-24 RX ORDER — IOPAMIDOL 755 MG/ML
500 INJECTION, SOLUTION INTRAVASCULAR ONCE
Status: COMPLETED | OUTPATIENT
Start: 2020-01-24 | End: 2020-01-24

## 2020-01-24 RX ORDER — ONDANSETRON 2 MG/ML
4 INJECTION INTRAMUSCULAR; INTRAVENOUS EVERY 30 MIN PRN
Status: DISCONTINUED | OUTPATIENT
Start: 2020-01-24 | End: 2020-01-25 | Stop reason: HOSPADM

## 2020-01-24 RX ORDER — ACETAMINOPHEN 325 MG/1
975 TABLET ORAL ONCE
Status: COMPLETED | OUTPATIENT
Start: 2020-01-24 | End: 2020-01-24

## 2020-01-24 RX ORDER — KETOROLAC TROMETHAMINE 15 MG/ML
15 INJECTION, SOLUTION INTRAMUSCULAR; INTRAVENOUS ONCE
Status: COMPLETED | OUTPATIENT
Start: 2020-01-24 | End: 2020-01-24

## 2020-01-24 RX ADMIN — ONDANSETRON HYDROCHLORIDE 4 MG: 2 INJECTION, SOLUTION INTRAMUSCULAR; INTRAVENOUS at 22:50

## 2020-01-24 RX ADMIN — KETOROLAC TROMETHAMINE 15 MG: 15 INJECTION, SOLUTION INTRAMUSCULAR; INTRAVENOUS at 22:49

## 2020-01-24 RX ADMIN — SODIUM CHLORIDE 64 ML: 9 INJECTION, SOLUTION INTRAVENOUS at 23:20

## 2020-01-24 RX ADMIN — ACETAMINOPHEN 975 MG: 325 TABLET, FILM COATED ORAL at 22:52

## 2020-01-24 RX ADMIN — IOPAMIDOL 96 ML: 755 INJECTION, SOLUTION INTRAVENOUS at 23:20

## 2020-01-24 ASSESSMENT — ENCOUNTER SYMPTOMS
BACK PAIN: 1
CHILLS: 1
ABDOMINAL PAIN: 1
DYSURIA: 0
CONSTIPATION: 0
VOMITING: 0
HEMATURIA: 0
FEVER: 0

## 2020-01-24 NOTE — ED AVS SNAPSHOT
[facility] Emergency Department  [address]  [facility] [address]  Phone:  [phone]  Fax:  [phone]                                    Corry Souza   MRN: [id number]    Department:  [facility] Emergency Department   Date of Visit:  1/24/2020           After Visit Summary Signature Page    I have received my discharge instructions, and my questions have been answered. I have discussed any challenges I see with this plan with the nurse or doctor.    ..........................................................................................................................................  Patient/Patient Representative Signature      ..........................................................................................................................................  Patient Representative Print Name and Relationship to Patient    ..................................................               ................................................  Date                                   Time    ..........................................................................................................................................  Reviewed by Signature/Title    ...................................................              ..............................................  Date                                               Time          22EPIC Rev 08/18

## 2020-01-25 NOTE — ED PROVIDER NOTES
"  History     Chief Complaint:  Abdominal Pain    HPI   Corry Souza is a 24 year old female who presents with abdominal pain. The patient reports that recently started to notice black colored discharge from her vagina of which she was seen by her OB/GYN today where she had a negative pregnancy test. The patient also had a pelvic exam where they noted the black vaginal discharge and a reassuring pelvic/vaginal US. The patient continues to report that she has had left sided abdominal pain that radiates into her back that is a 5/10 on severity with associated chills. When the patient had a bowel movement she describes that it \"makes me want to pass out\" and she noticed a large red lump come out of her rectum and it is painful to sit. The patient denies hematuria, dysuria, constipation, vomiting, fever, vaginal itchiness, or clots from the vagina. She has had a history of hemorrhoids when she was pregnant but does not have the same pain that she had with these. The patient's last menstrual was last month and she notes that she gets this irregularly. She has never had a STD but has had a history of bacterial vaginosis. The patient denies a history of diverticulitis.     Pelvic ultrasound 1/24/2020 [facility]  Impression: Normal pelvic ultrasound  Normal appearing uterus.  Endometrial cavity is empty.  Endometrial lining appears normal.  Right ovary appears normal.  Left ovary appears normal.  No adnexal masses seen.    Allergies:  Peanuts      Medications:    Levothyroxine Sodium  Birth control     Past Medical History:    Thyroid disease  Urticaria   Contact dermatitis   Irregular menstrual bleeding     Past Surgical History:    PE tubes  Left ankle arthroscopy     Family History:    Paternal grandfather: heart disease    Social History:  The patient was unaccompanied to the ED.  Smoking Status: Passive Smoke Exposure - Never Smoker  Smokeless Tobacco: Never Used  Alcohol Use: Negative   Drug Use: Negative  PCP: " Norma Butler   Marital Status:  Single      Review of Systems   Constitutional: Positive for chills. Negative for fever.   Gastrointestinal: Positive for abdominal pain. Negative for constipation and vomiting.        Large red lump from rectum   Genitourinary: Positive for vaginal discharge (black). Negative for dysuria, hematuria and vaginal bleeding.        No vaginal itchiness   Musculoskeletal: Positive for back pain.   All other systems reviewed and are negative.    Physical Exam     Patient Vitals for the past 24 hrs:   BP Temp Temp src Pulse Heart Rate Resp SpO2 Weight   01/24/20 2121 (!) 143/99 97.7  F (36.5  C) Temporal 101 101 18 99 % 87.1 kg (192 lb)      Physical Exam  General: Well appearing, well nourished. Normal mood and affect.  Skin: Good turgor, no rash, no unusual bruising or prominent lesions.  HEENT: Head: Normocephalic, atraumatic, no visible masses.   Eyes: Conjunctiva clear.  Throat/pharynx: Mucous membranes moist, no mucosal lesions.   Cardiac: Normal rate and regular rhythm, no murmur or gallop.   Lungs: Clear to auscultation.  Abdomen: Mild left lower quadrant tenderness with palpation.  No rebound or guarding.  No pain at McBurney's.  Negative Evans's.  No CVA tenderness bilaterally.  Musculoskeletal: Normal gait and station. No calf tenderness or swelling.   Neurologic: Oriented x 3. GCS: 15.  Psychiatric: Intact recent and remote memory, judgment and insight, normal mood and affect.   Rectal: Small soft nonthrombosed external hemorrhoids that are nontender.  No palpable mass or internal hemorrhoids.  No significant pain with rectal exam.  Pelvic: Normal external genitalia.  Small amount of a dark old blood seen in vaginal vault.  Mild amount of tissue discoloration surrounding the external cervical os, appears mildly friable.  No significant tenderness with this.  No cervical motion tenderness.    Emergency Department Course     Imaging:  Radiology findings were communicated with  the patient who voiced understanding of the findings.    CT Abdomen Pelvis w Contrast  1. A trace amount of nonspecific free fluid in the pelvis.  2. No other cause of acute pain identified in the abdomen or pelvis   Reading per radiology      Laboratory:  Laboratory findings were communicated with the patient who voiced understanding of the findings.    Wet Prep: WNL.   Neisseria Gonorrhoeae PCR: Pending   Chlamydia Trachomatis PCR: Pending     CBC: WBC 10.2, HGB 13.5,   BMP: WNL (Creatinine 0.87)    ISTAT HCG Quantitative: <5.0     Interventions:  2249 Toradol 15 mg IV  2250 Zofran 4 mg IV  2252 Tylenol 975 mg PO     Emergency Department Course:    2159 Nursing notes and vitals reviewed. I performed an exam of the patient as documented above.     2227 IV was inserted and blood was drawn for laboratory testing, results above.     2251 A wet prep sample was obtained for laboratory testing as documented above.     2320 The patient was sent for a CT while in the emergency department, results above.     0013 Prior to discharge, I personally reviewed the results with the patient  and all related questions were answered. The patient verbalized understanding and is amenable to plan.     Impression & Plan      Medical Decision Making:  Corry Souza is a 24 year old female who presents to the emergency department today for evaluation of abdominal pain, vaginal discharge, external hemorrhoids.  Details the patient's history can be known the HPI.  Differentials considered included tubo-ovarian abscess, ovarian torsion, ectopic pregnancy, menstruation, ruptured ovarian cyst, appendicitis, diverticulitis, colitis, UTI, pyelonephritis, amongst others.  Upon my exam, she did have mild left lower abdominal pain.  There was old blood seen in the vaginal vault along with abnormal tissue of the cervix.  Nonthrombosed external hemorrhoids also noted.  Given the patient's ongoing discomfort with recent normal pelvic ultrasound  (results as noted beneath HPI) CT obtained to look for other pathology.  This returned showing small amount of free fluid within the abdomen without other acute pathology. Pelvic exam completed, with wet prep returning showing no acute abnormalities.  Basic labs normal as well.  Pregnancy negative.  Patient symptoms were well controlled with the medications as noted above.    Given the patient's history and work-up, unclear as to the cause of her discomfort, however great suspicion is for potential ruptured ovarian cyst.  Suspect external hemorrhoids are due to straining.  We discussed increasing fiber and adding MiraLAX.  She did have some abnormal tissue seen on pelvic exam on the cervix.  I did give her OBs information and she will call Monday to schedule follow-up for further evaluation of this.  Pain was controlled here, and I feel comfortable with her discharge.  She will continue Tylenol and ibuprofen at home.  She will return for any changing worsening abdominal pain, uncontrolled vomiting, fevers over 101, new concerns.  All questions were answered prior to discharge.  She was in agreement with the treatment plan as stated above.    Diagnosis:    ICD-10-CM    1. LLQ abdominal pain R10.32    2. Vaginal discharge N89.8    3. External hemorrhoids K64.4      Disposition:   The patient is discharged to home.     Discharge Medications:  No discharge medications.     Scribe Disclosure:  I, Orla Severson, am serving as a scribe at 10:02 PM on 1/24/2020 to document services personally performed by Kathleen Corley PA based on my observations and the provider's statements to me.   [facility] EMERGENCY DEPARTMENT    This was created at least in part with a voice recognition software. Mistakes/typos may be present.        Kathleen Corley PA  01/25/20 0318

## 2020-01-25 NOTE — DISCHARGE INSTRUCTIONS
Continue Tylenol, ibuprofen, heating pads to help with discomfort.  Call the OB number above and be seen next week to recheck your symptoms and the tissue seen on your cervix.  Return for any changing worsening abdominal pain, fevers, uncontrolled vomiting, new concerns.  The vaginal discharge should stop within a few days.  To help with external hemorrhoids, add MiraLAX 1-2 times daily.  Try and not sit for long periods of time when trying to have a bowel movement  as this will increase your risk of having these hemorrhoids even if the bowel movements are soft.,    Discharge Instructions  Abdominal Pain    Abdominal pain can be caused by many things. Your evaluation today does not show the exact cause for your pain. Your doctor today has decided that it is unlikely your pain is due to a life threatening problem, or a problem requiring surgery or hospital admission. Sometimes those problems cannot be found right away, so it is very important that you follow up as directed.  Sometimes only the changes which occur over time allow the cause of your pain to be found.    Return to the Emergency Department for a recheck in 8-12 hours if your pain continues.  If your pain gets worse, changes in location, or feels different, return to the Emergency Department right away.    ADULTS:  Return to the Emergency Department right away if:    You get an oral temperature above 102oF or as directed by your doctor.  You have blood in your stools (bright red or black, tarry stools).  You keep throwing up or can t drink liquids.  You see blood when you throw up.  You can t have a bowel movement or you can t pass gas.  Your stomach gets bloated or bigger.  Your skin or the whites of your eyes look yellow.  You faint.  You have bloody, frequent or painful urination.  You have new symptoms or anything that worries you.    CHILDREN:  Return to the Emergency Department right away if your child has any of the above-listed symptoms or the  following:    Pushes your hand away or screams/cries when his/her belly is touched.  You notice your child is very fussy or weak.  Your child is very tired and is too tired to eat or drink.  Your child is dehydrated.  Signs of dehydration can be:  Your infant has had no wet diapers in 4-5 hours.  Your older child has not passed urine in 6-8 hours.  Your infant or child starts to have dry mouth and lips, or no saliva or tears.    PREGNANT WOMEN:  Return to the Emergency Department right away if you have any of the above-listed symptoms or the following:    You have bleeding, leaking fluid or passing tissue from the vagina.  You have worse pain or cramping, or pain in your shoulder or back.  You have vomiting that will not stop.  You have painful or bloody urination.  You have a temperature of 100oF or more.  Your baby is not moving as much as usual.  You faint.  You get a bad headache with or without eye problems and abdominal pain.  You have a convulsion or seizure.  You have unusual discharge from your vagina and abdominal pain.    Abdominal pain is pretty common during pregnancy.  Your pain may or may not be related to your pregnancy. You should follow-up closely with your OB doctor so they can evaluate you and your baby.  Until you follow-up with your regular doctor, do the following:     Avoid sex and do not put anything in your vagina.  Drink clear fluids.  Only take medications approved by your doctor.    MORE INFORMATION:    Appendicitis:  A possible cause of abdominal pain in any person who still has their appendix is acute appendicitis. Appendicitis is often hard to diagnose.  Testing does not always rule out early appendicitis or other causes of abdominal pain. Close follow-up with your doctor and re-evaluations may be needed to figure out the reason for your abdominal pain.    Follow-up:  It is very important that you make an appointment with your clinic and go to the appointment.  If you do not follow-up  "with your primary doctor, it may result in missing an important development which could result in permanent injury or disability and/or lasting pain.  If there is any problem keeping your appointment, call your doctor or return to the Emergency Department.    Medications:  Take your medications as directed by your doctor today.  Before using over-the-counter medications, ask your doctor and make sure to take the medications as directed.  If you have any questions about medications, ask your doctor.    Diet:  Resume your normal diet as much as possible, but do not eat fried, fatty or spicy foods while you have pain.  Do not drink alcohol or have caffeine.  Do not smoke tobacco.    Probiotics: If you have been given an antibiotic, you may want to also take a probiotic pill or eat yogurt with live cultures. Probiotics have \"good bacteria\" to help your intestines stay healthy. Studies have shown that probiotics help prevent diarrhea and other intestine problems (including C. diff infection) when you take antibiotics. You can buy these without a prescription in the pharmacy section of the store.     If you were given a prescription for medicine here today, be sure to read all of the information (including the package insert) that comes with your prescription.  This will include important information about the medicine, its side effects, and any warnings that you need to know about.  The pharmacist who fills the prescription can provide more information and answer questions you may have about the medicine.  If you have questions or concerns that the pharmacist cannot address, please call or return to the Emergency Department.         Opioid Medication Information    Pain medications are among the most commonly prescribed medicines, so we are including this information for all our patients. If you did not receive pain medication or get a prescription for pain medicine, you can ignore it.     You may have been given a " prescription for an opioid (narcotic) pain medicine and/or have received a pain medicine while here in the Emergency Department. These medicines can make you drowsy or impaired. You must not drive, operate dangerous equipment, or engage in any other dangerous activities while taking these medications. If you drive while taking these medications, you could be arrested for DUI, or driving under the influence. Do not drink any alcohol while you are taking these medications.     Opioid pain medications can cause addiction. If you have a history of chemical dependency of any type, you are at a higher risk of becoming addicted to pain medications.  Only take these prescribed medications to treat your pain when all other options have been tried. Take it for as short a time and as few doses as possible. Store your pain pills in a secure place, as they are frequently stolen and provide a dangerous opportunity for children or visitors in your house to start abusing these powerful medications. We will not replace any lost or stolen medicine.  As soon as your pain is better, you should flush all your remaining medication.     Many prescription pain medications contain Tylenol  (acetaminophen), including Vicodin , Tylenol #3 , Norco , Lortab , and Percocet .  You should not take any extra pills of Tylenol  if you are using these prescription medications or you can get very sick.  Do not ever take more than 3000 mg of acetaminophen in any 24 hour period.    All opioids tend to cause constipation. Drink plenty of water and eat foods that have a lot of fiber, such as fruits, vegetables, prune juice, apple juice and high fiber cereal.  Take a laxative if you don t move your bowels at least every other day. Miralax , Milk of Magnesia, Colace , or Senna  can be used to keep you regular.      Remember that you can always come back to the Emergency Department if you are not able to see your regular doctor in the amount of time listed  above, if you get any new symptoms, or if there is anything that worries you.

## 2020-01-26 LAB
C TRACH DNA SPEC QL NAA+PROBE: NEGATIVE
N GONORRHOEA DNA SPEC QL NAA+PROBE: NEGATIVE
SPECIMEN SOURCE: NORMAL
SPECIMEN SOURCE: NORMAL

## 2021-04-18 ENCOUNTER — HOSPITAL ENCOUNTER (EMERGENCY)
Facility: CLINIC | Age: 26
Discharge: HOME | End: 2021-04-18
Attending: EMERGENCY MEDICINE | Admitting: EMERGENCY MEDICINE
Payer: COMMERCIAL

## 2021-04-18 VITALS
SYSTOLIC BLOOD PRESSURE: 120 MMHG | HEART RATE: 84 BPM | RESPIRATION RATE: 16 BRPM | BODY MASS INDEX: 30.55 KG/M2 | TEMPERATURE: 97.6 F | DIASTOLIC BLOOD PRESSURE: 76 MMHG | WEIGHT: 178 LBS | OXYGEN SATURATION: 99 %

## 2021-04-18 DIAGNOSIS — B34.9 VIRAL SYNDROME: ICD-10-CM

## 2021-04-18 DIAGNOSIS — R21 RASH: ICD-10-CM

## 2021-04-18 LAB
ANION GAP SERPL CALCULATED.3IONS-SCNC: 1 MMOL/L (ref 3–14)
BASOPHILS # BLD AUTO: 0.1 10E9/L (ref 0–0.2)
BASOPHILS NFR BLD AUTO: 0.7 %
BUN SERPL-MCNC: 10 MG/DL (ref 7–30)
CALCIUM SERPL-MCNC: 8.5 MG/DL (ref 8.5–10.1)
CHLORIDE SERPL-SCNC: 107 MMOL/L (ref 94–109)
CO2 SERPL-SCNC: 29 MMOL/L (ref 20–32)
CREAT SERPL-MCNC: 0.92 MG/DL (ref 0.52–1.04)
DIFFERENTIAL METHOD BLD: NORMAL
EOSINOPHIL # BLD AUTO: 0.1 10E9/L (ref 0–0.7)
EOSINOPHIL NFR BLD AUTO: 2.1 %
ERYTHROCYTE [DISTWIDTH] IN BLOOD BY AUTOMATED COUNT: 13.6 % (ref 10–15)
GFR SERPL CREATININE-BSD FRML MDRD: 85 ML/MIN/{1.73_M2}
GLUCOSE SERPL-MCNC: 87 MG/DL (ref 70–99)
HCT VFR BLD AUTO: 40.5 % (ref 35–47)
HETEROPH AB SER QL: NEGATIVE
HGB BLD-MCNC: 13.8 G/DL (ref 11.7–15.7)
IMM GRANULOCYTES # BLD: 0 10E9/L (ref 0–0.4)
IMM GRANULOCYTES NFR BLD: 0.1 %
LYMPHOCYTES # BLD AUTO: 1.6 10E9/L (ref 0.8–5.3)
LYMPHOCYTES NFR BLD AUTO: 23 %
MCH RBC QN AUTO: 31.4 PG (ref 26.5–33)
MCHC RBC AUTO-ENTMCNC: 34.1 G/DL (ref 31.5–36.5)
MCV RBC AUTO: 92 FL (ref 78–100)
MONOCYTES # BLD AUTO: 0.6 10E9/L (ref 0–1.3)
MONOCYTES NFR BLD AUTO: 8.5 %
NEUTROPHILS # BLD AUTO: 4.4 10E9/L (ref 1.6–8.3)
NEUTROPHILS NFR BLD AUTO: 65.6 %
NRBC # BLD AUTO: 0 10*3/UL
NRBC BLD AUTO-RTO: 0 /100
PLATELET # BLD AUTO: 168 10E9/L (ref 150–450)
POTASSIUM SERPL-SCNC: 4 MMOL/L (ref 3.4–5.3)
RBC # BLD AUTO: 4.4 10E12/L (ref 3.8–5.2)
SODIUM SERPL-SCNC: 137 MMOL/L (ref 133–144)
T PALLIDUM AB SER QL: NONREACTIVE
WBC # BLD AUTO: 6.7 10E9/L (ref 4–11)

## 2021-04-18 PROCEDURE — 86780 TREPONEMA PALLIDUM: CPT | Performed by: EMERGENCY MEDICINE

## 2021-04-18 PROCEDURE — 87389 HIV-1 AG W/HIV-1&-2 AB AG IA: CPT | Performed by: EMERGENCY MEDICINE

## 2021-04-18 PROCEDURE — 86308 HETEROPHILE ANTIBODY SCREEN: CPT | Performed by: EMERGENCY MEDICINE

## 2021-04-18 PROCEDURE — 250N000009 HC RX 250: Performed by: EMERGENCY MEDICINE

## 2021-04-18 PROCEDURE — 87591 N.GONORRHOEAE DNA AMP PROB: CPT | Performed by: EMERGENCY MEDICINE

## 2021-04-18 PROCEDURE — 36415 COLL VENOUS BLD VENIPUNCTURE: CPT | Performed by: EMERGENCY MEDICINE

## 2021-04-18 PROCEDURE — 99284 EMERGENCY DEPT VISIT MOD MDM: CPT

## 2021-04-18 PROCEDURE — 96372 THER/PROPH/DIAG INJ SC/IM: CPT | Performed by: EMERGENCY MEDICINE

## 2021-04-18 PROCEDURE — 250N000011 HC RX IP 250 OP 636: Performed by: EMERGENCY MEDICINE

## 2021-04-18 PROCEDURE — 85025 COMPLETE CBC W/AUTO DIFF WBC: CPT | Performed by: EMERGENCY MEDICINE

## 2021-04-18 PROCEDURE — 80048 BASIC METABOLIC PNL TOTAL CA: CPT | Performed by: EMERGENCY MEDICINE

## 2021-04-18 RX ORDER — KETOROLAC TROMETHAMINE 30 MG/ML
30 INJECTION, SOLUTION INTRAMUSCULAR; INTRAVENOUS ONCE
Status: COMPLETED | OUTPATIENT
Start: 2021-04-18 | End: 2021-04-18

## 2021-04-18 RX ORDER — DEXAMETHASONE SODIUM PHOSPHATE 4 MG/ML
10 VIAL (ML) INJECTION ONCE
Status: COMPLETED | OUTPATIENT
Start: 2021-04-18 | End: 2021-04-18

## 2021-04-18 RX ORDER — DEXAMETHASONE SODIUM PHOSPHATE 10 MG/ML
10 INJECTION, SOLUTION INTRAMUSCULAR; INTRAVENOUS ONCE
Status: DISCONTINUED | OUTPATIENT
Start: 2021-04-18 | End: 2021-04-18

## 2021-04-18 RX ORDER — KETOROLAC TROMETHAMINE 15 MG/ML
15 INJECTION, SOLUTION INTRAMUSCULAR; INTRAVENOUS ONCE
Status: DISCONTINUED | OUTPATIENT
Start: 2021-04-18 | End: 2021-04-18

## 2021-04-18 RX ADMIN — KETOROLAC TROMETHAMINE 30 MG: 30 INJECTION, SOLUTION INTRAMUSCULAR at 11:37

## 2021-04-18 RX ADMIN — DEXAMETHASONE SODIUM PHOSPHATE 10 MG: 4 INJECTION, SOLUTION INTRAMUSCULAR; INTRAVENOUS at 11:53

## 2021-04-18 NOTE — ED TRIAGE NOTES
Patient presents to the ED reporting a sore throat. States was negative for strep on Friday and diagnosed with thrush and started on nystatin. States is not helping. Also has developed a rash on arms and legs.

## 2021-04-18 NOTE — DISCHARGE INSTRUCTIONS
1. -Take acetaminophen 500 to 1000 mg by mouth every 4 to 6 hours as needed for pain or fever.  Do not take more than 4000 mg in 24 hours.  Do not take within 6 hours of another acetaminophen containing medication such as norco (vicodin) or percocet.  - Take ibuprofen 600 to 800 mg by mouth every 6 to 8 hours as needed for pain or fever  2.  You May try over-the-counter Benadryl at home.  3.  Please return to the emergency department as needed for new or worsening symptoms including fever greater than 100.4  F, vomiting and unable to keep anything down, sloughing skin, sloughing or sores inside her mouth, any other concerning symptoms.    Please continue nystatin as previously prescribed.

## 2021-04-19 ENCOUNTER — APPOINTMENT (OUTPATIENT)
Dept: CT IMAGING | Facility: CLINIC | Age: 26
End: 2021-04-19
Attending: EMERGENCY MEDICINE
Payer: COMMERCIAL

## 2021-04-19 ENCOUNTER — HOSPITAL ENCOUNTER (EMERGENCY)
Facility: CLINIC | Age: 26
Discharge: HOME | End: 2021-04-19
Attending: EMERGENCY MEDICINE | Admitting: EMERGENCY MEDICINE
Payer: COMMERCIAL

## 2021-04-19 VITALS
TEMPERATURE: 99.2 F | HEART RATE: 76 BPM | OXYGEN SATURATION: 98 % | DIASTOLIC BLOOD PRESSURE: 78 MMHG | SYSTOLIC BLOOD PRESSURE: 119 MMHG | RESPIRATION RATE: 16 BRPM

## 2021-04-19 DIAGNOSIS — J02.0 STREPTOCOCCAL PHARYNGITIS: ICD-10-CM

## 2021-04-19 DIAGNOSIS — J03.90 TONSILLITIS: ICD-10-CM

## 2021-04-19 LAB
ANION GAP SERPL CALCULATED.3IONS-SCNC: 3 MMOL/L (ref 3–14)
BASOPHILS # BLD AUTO: 0.1 10E9/L (ref 0–0.2)
BASOPHILS NFR BLD AUTO: 0.3 %
BUN SERPL-MCNC: 10 MG/DL (ref 7–30)
CALCIUM SERPL-MCNC: 8.7 MG/DL (ref 8.5–10.1)
CHLORIDE SERPL-SCNC: 109 MMOL/L (ref 94–109)
CO2 SERPL-SCNC: 28 MMOL/L (ref 20–32)
CREAT SERPL-MCNC: 0.87 MG/DL (ref 0.52–1.04)
DEPRECATED S PYO AG THROAT QL EIA: POSITIVE
DIFFERENTIAL METHOD BLD: ABNORMAL
EOSINOPHIL # BLD AUTO: 0 10E9/L (ref 0–0.7)
EOSINOPHIL NFR BLD AUTO: 0.2 %
ERYTHROCYTE [DISTWIDTH] IN BLOOD BY AUTOMATED COUNT: 13.6 % (ref 10–15)
GFR SERPL CREATININE-BSD FRML MDRD: >90 ML/MIN/{1.73_M2}
GLUCOSE SERPL-MCNC: 99 MG/DL (ref 70–99)
HCT VFR BLD AUTO: 41.9 % (ref 35–47)
HGB BLD-MCNC: 13.6 G/DL (ref 11.7–15.7)
HIV 1+2 AB+HIV1 P24 AG SERPL QL IA: NONREACTIVE
IMM GRANULOCYTES # BLD: 0.1 10E9/L (ref 0–0.4)
IMM GRANULOCYTES NFR BLD: 0.4 %
LABORATORY COMMENT REPORT: NORMAL
LYMPHOCYTES # BLD AUTO: 2.4 10E9/L (ref 0.8–5.3)
LYMPHOCYTES NFR BLD AUTO: 14.8 %
MCH RBC QN AUTO: 30.6 PG (ref 26.5–33)
MCHC RBC AUTO-ENTMCNC: 32.5 G/DL (ref 31.5–36.5)
MCV RBC AUTO: 94 FL (ref 78–100)
MONOCYTES # BLD AUTO: 1 10E9/L (ref 0–1.3)
MONOCYTES NFR BLD AUTO: 6.3 %
N GONORRHOEA DNA SPEC QL NAA+PROBE: ABNORMAL
NEUTROPHILS # BLD AUTO: 12.4 10E9/L (ref 1.6–8.3)
NEUTROPHILS NFR BLD AUTO: 78 %
NRBC # BLD AUTO: 0 10*3/UL
NRBC BLD AUTO-RTO: 0 /100
PLATELET # BLD AUTO: 191 10E9/L (ref 150–450)
POTASSIUM SERPL-SCNC: 3.8 MMOL/L (ref 3.4–5.3)
RBC # BLD AUTO: 4.45 10E12/L (ref 3.8–5.2)
SARS-COV-2 RNA RESP QL NAA+PROBE: NEGATIVE
SODIUM SERPL-SCNC: 140 MMOL/L (ref 133–144)
SPECIMEN SOURCE: ABNORMAL
SPECIMEN SOURCE: ABNORMAL
SPECIMEN SOURCE: NORMAL
WBC # BLD AUTO: 15.9 10E9/L (ref 4–11)

## 2021-04-19 PROCEDURE — 70491 CT SOFT TISSUE NECK W/DYE: CPT

## 2021-04-19 PROCEDURE — 250N000013 HC RX MED GY IP 250 OP 250 PS 637: Performed by: EMERGENCY MEDICINE

## 2021-04-19 PROCEDURE — 258N000003 HC RX IP 258 OP 636: Performed by: EMERGENCY MEDICINE

## 2021-04-19 PROCEDURE — 80048 BASIC METABOLIC PNL TOTAL CA: CPT | Performed by: EMERGENCY MEDICINE

## 2021-04-19 PROCEDURE — 96361 HYDRATE IV INFUSION ADD-ON: CPT

## 2021-04-19 PROCEDURE — 99285 EMERGENCY DEPT VISIT HI MDM: CPT | Mod: 25

## 2021-04-19 PROCEDURE — C9803 HOPD COVID-19 SPEC COLLECT: HCPCS

## 2021-04-19 PROCEDURE — 250N000011 HC RX IP 250 OP 636: Performed by: EMERGENCY MEDICINE

## 2021-04-19 PROCEDURE — 87880 STREP A ASSAY W/OPTIC: CPT | Performed by: EMERGENCY MEDICINE

## 2021-04-19 PROCEDURE — 85025 COMPLETE CBC W/AUTO DIFF WBC: CPT | Performed by: EMERGENCY MEDICINE

## 2021-04-19 PROCEDURE — 87635 SARS-COV-2 COVID-19 AMP PRB: CPT | Performed by: EMERGENCY MEDICINE

## 2021-04-19 PROCEDURE — 250N000009 HC RX 250: Performed by: EMERGENCY MEDICINE

## 2021-04-19 PROCEDURE — 96365 THER/PROPH/DIAG IV INF INIT: CPT | Mod: 59

## 2021-04-19 RX ORDER — OXYCODONE HCL 5 MG/5 ML
5 SOLUTION, ORAL ORAL EVERY 6 HOURS PRN
Qty: 30 ML | Refills: 0 | Status: SHIPPED | OUTPATIENT
Start: 2021-04-19 | End: 2021-04-22

## 2021-04-19 RX ORDER — DEXAMETHASONE SODIUM PHOSPHATE 10 MG/ML
10 INJECTION, SOLUTION INTRAMUSCULAR; INTRAVENOUS ONCE
Status: COMPLETED | OUTPATIENT
Start: 2021-04-19 | End: 2021-04-19

## 2021-04-19 RX ORDER — IOPAMIDOL 755 MG/ML
500 INJECTION, SOLUTION INTRAVASCULAR ONCE
Status: COMPLETED | OUTPATIENT
Start: 2021-04-19 | End: 2021-04-19

## 2021-04-19 RX ORDER — AMPICILLIN AND SULBACTAM 2; 1 G/1; G/1
3 INJECTION, POWDER, FOR SOLUTION INTRAMUSCULAR; INTRAVENOUS ONCE
Status: COMPLETED | OUTPATIENT
Start: 2021-04-19 | End: 2021-04-19

## 2021-04-19 RX ORDER — OXYCODONE HCL 5 MG/5 ML
5 SOLUTION, ORAL ORAL EVERY 4 HOURS PRN
Status: DISCONTINUED | OUTPATIENT
Start: 2021-04-19 | End: 2021-04-19 | Stop reason: HOSPADM

## 2021-04-19 RX ADMIN — AMPICILLIN SODIUM AND SULBACTAM SODIUM 3 G: 2; 1 INJECTION, POWDER, FOR SOLUTION INTRAMUSCULAR; INTRAVENOUS at 08:11

## 2021-04-19 RX ADMIN — DEXAMETHASONE SODIUM PHOSPHATE 10 MG: 10 INJECTION, SOLUTION INTRAMUSCULAR; INTRAVENOUS at 07:02

## 2021-04-19 RX ADMIN — IOPAMIDOL 80 ML: 755 INJECTION, SOLUTION INTRAVENOUS at 07:25

## 2021-04-19 RX ADMIN — SODIUM CHLORIDE 65 ML: 9 INJECTION, SOLUTION INTRAVENOUS at 07:25

## 2021-04-19 RX ADMIN — OXYCODONE HYDROCHLORIDE 5 MG: 5 SOLUTION ORAL at 07:03

## 2021-04-19 RX ADMIN — SODIUM CHLORIDE 1000 ML: 9 INJECTION, SOLUTION INTRAVENOUS at 06:42

## 2021-04-19 ASSESSMENT — ENCOUNTER SYMPTOMS
TROUBLE SWALLOWING: 1
VOICE CHANGE: 1
COUGH: 0
SORE THROAT: 1
FEVER: 1
SHORTNESS OF BREATH: 0
DIARRHEA: 0
CHILLS: 1
VOMITING: 0

## 2021-04-19 NOTE — RESULT ENCOUNTER NOTE
Contacted Zhanna, ED charge nurse at around 9:30A.  Patient tested positive for strep group A.  Zhanna will notify Dr Hernandez about cancelled test.

## 2021-04-19 NOTE — ED PROVIDER NOTES
History   Chief Complaint:  Pharyngitis     HPI   Corry Souza is a 26 year old female who presents with pharyngitis. The patient was seen in the ED yesterday for 1 week of sore throat and diagnosed with pharyngitis. She was also tested for mono and treponema antibodies at this time, both negative. She returns today with increased throat pain, voice changes, and trouble swallowing. She is still able to drink fluids but is unable to swallow pills. She also notes some new left ear pain and some subjective fever/chills. States the throat pain is now 7/10 but was worse when she woke up. States the steroids received during her last visit did not help. Notes she tested positive for strep 3/18 and finished a course of antibiotics but never felt better. She has had a negative strep test since. The patient denies any shortness of breath, chest pain, cough, vomiting, diarrhea, or dental pain. Notes her last doses of Tylenol and ibuprofen were this morning, 1500 mg and 600 mg respectively. Also notes she had second dose of Moderna vaccine in early April.     Review of Systems   Constitutional: Positive for chills and fever (subjective).   HENT: Positive for ear pain, sore throat, trouble swallowing and voice change. Negative for dental problem.    Respiratory: Negative for cough and shortness of breath.    Cardiovascular: Negative for chest pain.   Gastrointestinal: Negative for diarrhea and vomiting.   All other systems reviewed and are negative.    Allergies:  The patient has no known allergies.     Medications:  Wellbutrin   Truvada   Prilosec  Zoloft  Ocella   Synthroid     Past Medical History:    Hypothyroidism   Dermatitis  Urticaria   Migraine   Asthma     Past Surgical History:    PE tubes  Left ankle arthroscopy x2   Tooth extraction     Family History:    Alcoholism  CAD  Hypertension  Thyroid disease     Social History:  Patient presents alone.  Denies alcohol or tobacco use.     Physical Exam     Patient Vitals  for the past 24 hrs:   BP Temp Temp src Pulse Resp SpO2   04/19/21 0845 125/73 -- -- 76 -- 98 %   04/19/21 0830 116/74 -- -- 79 -- 99 %   04/19/21 0815 125/69 -- -- 79 -- 98 %   04/19/21 0700 120/75 -- -- -- 16 100 %   04/19/21 0645 -- -- -- -- -- 100 %   04/19/21 0640 -- -- -- -- -- 99 %   04/19/21 0635 -- -- -- -- -- 98 %   04/19/21 0630 -- -- -- -- -- 98 %   04/19/21 0625 -- -- -- -- -- 99 %   04/19/21 0620 -- -- -- -- -- 98 %   04/19/21 0615 -- -- -- -- -- 97 %   04/19/21 0610 -- -- -- -- -- 97 %   04/19/21 0605 -- -- -- -- -- 97 %   04/19/21 0600 -- -- -- -- -- 97 %   04/19/21 0555 -- -- -- -- -- 98 %   04/19/21 0550 -- -- -- -- -- 96 %   04/19/21 0545 -- -- -- -- -- 97 %   04/19/21 0540 -- -- -- -- -- 97 %   04/19/21 0535 -- -- -- -- -- 98 %   04/19/21 0530 119/79 -- -- 79 -- --   04/19/21 0424 132/89 99.2  F (37.3  C) Oral 89 20 98 %     Physical Exam  General: Patient is awake, alert and interactive when I enter the room  Head: The scalp, face, and head appear normal  Eyes: The pupils are equal, round, and reactive to light. Conjunctivae and sclerae are normal  ENT: Bilateral tonsillar erythema and hypertrophy with associated exudates.  No uvular deviation or evidence of peritonsillar abscess.  Neck: Normal range of motion.   CV: Regular rate and rhythm.   Resp: Lungs are clear without wheezes or rales. No respiratory distress.   GI: Abdomen is soft, no rigidity, guarding, or rebound. No distension. No tenderness to palpation in any quadrant.     MS: Normal tone. Joints grossly normal without effusions.   Skin: No rash or lesions noted. Normal capillary refill noted  Neuro: Speech is normal and fluent. Face is symmetric. Moving all extremities.   Psych:  Normal affect.  Appropriate interactions.    Emergency Department Course     Imaging:    CT Soft Tissue Neck w/ IV contrast:   1. Enlargement and heterogeneous enhancement of the palatine tonsils   bilaterally suggesting tonsillitis. No evidence for  tonsillar or   peritonsillar abscess.   2. Probable multinodular goiter.   3. Mild reactive lymphadenopathy in the suprahyoid neck on both sides.   4. Otherwise, normal soft tissue neck CT, as per radiology    Laboratory:    CBC: WBC: 15.9 (H), HGB: 13.6, PLT: 191  BMP: WNL (Creatinine: 0.87)    Symptomatic Influenza A/B & COVID PCR: Negative   Strep A Rapid: Positive    Emergency Department Course:    Reviewed:  I reviewed the patient's nursing notes, vitals, past medical records, Care Everywhere.     Assessments:  0650    I evaluated the patient and performed an exam as above.    0755    I updated the patient on results and discussed plan of care. She is feeling improved.     0909 On recheck the patient is feeling better and is comfortable with discharge.     Interventions:  0642 NS, 1 L, IV  0702 Decadron, 10 mg, IV  0703 Roxicodone, 5 mg, Oral   0811 Unasyn, 3 g, IV    Disposition:  The patient was discharged to home.     Impression & Plan     CMS Diagnoses: None    Medical Decision Making:  Karli is a very nice 26-year-old female who was seen in our emergency department yesterday for sore throat and pharyngitis.  Work-up was negative at that time and she was treated with steroids and Toradol.  She returns here today with worsening pain, difficulty swallowing and voice changes.  On physical exam she has some significant tonsillar swelling bilaterally but no evidence of a peritonsillar abscess.  CT soft tissue of the neck demonstrates significant tonsillitis but no evidence of retropharyngeal or tonsillar abscess.  Patient strep came back positive.  She will be treated with antibiotics and will continue to push fluids.  I will give her a short course of oxycodone given her significant pain and difficulty swallowing.  After dose of liquid oxycodone to the emergency department she was able to tolerate fluids and feels significantly better.  Goiter seen on CT scan is a chronic condition for this patient and not  new.    Covid-19  Corry Souza was evaluated during a global COVID-19 pandemic, which necessitated consideration that the patient might be at risk for infection with the SARS-CoV-2 virus that causes COVID-19.   Applicable protocols for evaluation were followed during the patient's care.   COVID-19 was considered as part of the patient's evaluation. The plan for testing is:  a test was obtained during this visit.    Diagnosis:    ICD-10-CM    1. Tonsillitis  J03.90    2. Streptococcal pharyngitis  J02.0        Discharge Medications:  New Prescriptions    AMOXICILLIN-CLAVULANATE (AUGMENTIN) 875-125 MG TABLET    Take 1 tablet by mouth 2 times daily for 7 days    OXYCODONE (ROXICODONE) 5 MG/5ML SOLUTION    Take 5 mLs (5 mg) by mouth every 6 hours as needed for severe pain       Scribe Disclosure:  Joaquina KHALIL, am serving as a scribe at 6:11 AM on 4/19/2021 to document services personally performed by Felix Hernandez MD based on my observations and the provider's statements to me.      Felix Hernandez MD  04/19/21 8998

## 2021-04-19 NOTE — ED TRIAGE NOTES
Pt states seen at this facility within past 48 hours for pharyngitis. Pt states having worsening pharyngitis since discharge despite medications given during previous stay. Pt also states having voice change with pharyngitis which is new. Pt denies dyspnea. ABCs intact GCS 15

## 2021-10-22 ENCOUNTER — HOSPITAL ENCOUNTER (EMERGENCY)
Facility: CLINIC | Age: 26
Discharge: HOME | End: 2021-10-22
Attending: EMERGENCY MEDICINE | Admitting: EMERGENCY MEDICINE

## 2021-10-22 VITALS
DIASTOLIC BLOOD PRESSURE: 71 MMHG | HEART RATE: 98 BPM | WEIGHT: 180 LBS | SYSTOLIC BLOOD PRESSURE: 126 MMHG | BODY MASS INDEX: 30.73 KG/M2 | TEMPERATURE: 97.5 F | OXYGEN SATURATION: 98 % | HEIGHT: 64 IN | RESPIRATION RATE: 18 BRPM

## 2021-10-22 DIAGNOSIS — R45.851 SUICIDAL IDEATION: ICD-10-CM

## 2021-10-22 DIAGNOSIS — T50.902A INTENTIONAL DRUG OVERDOSE, INITIAL ENCOUNTER (H): ICD-10-CM

## 2021-10-22 LAB
ALBUMIN SERPL-MCNC: 4.1 G/DL (ref 3.4–5)
ALBUMIN UR-MCNC: NEGATIVE MG/DL
ALP SERPL-CCNC: 67 U/L (ref 40–150)
ALT SERPL W P-5'-P-CCNC: 21 U/L (ref 0–50)
ANION GAP SERPL CALCULATED.3IONS-SCNC: 5 MMOL/L (ref 3–14)
APAP SERPL-MCNC: <2 MG/L (ref 10–30)
APPEARANCE UR: CLEAR
AST SERPL W P-5'-P-CCNC: 13 U/L (ref 0–45)
ATRIAL RATE - MUSE: 105 BPM
BACTERIA #/AREA URNS HPF: ABNORMAL /HPF
BASOPHILS # BLD AUTO: 0.1 10E3/UL (ref 0–0.2)
BASOPHILS NFR BLD AUTO: 1 %
BILIRUB SERPL-MCNC: 0.4 MG/DL (ref 0.2–1.3)
BILIRUB UR QL STRIP: NEGATIVE
BUN SERPL-MCNC: 14 MG/DL (ref 7–30)
CALCIUM SERPL-MCNC: 8.6 MG/DL (ref 8.5–10.1)
CHLORIDE BLD-SCNC: 107 MMOL/L (ref 94–109)
CO2 SERPL-SCNC: 28 MMOL/L (ref 20–32)
COLOR UR AUTO: ABNORMAL
CREAT SERPL-MCNC: 1.05 MG/DL (ref 0.52–1.04)
DIASTOLIC BLOOD PRESSURE - MUSE: NORMAL MMHG
EOSINOPHIL # BLD AUTO: 0.1 10E3/UL (ref 0–0.7)
EOSINOPHIL NFR BLD AUTO: 1 %
ERYTHROCYTE [DISTWIDTH] IN BLOOD BY AUTOMATED COUNT: 12.5 % (ref 10–15)
ETHANOL SERPL-MCNC: <0.01 G/DL
GFR SERPL CREATININE-BSD FRML MDRD: 72 ML/MIN/1.73M2
GLUCOSE BLD-MCNC: 80 MG/DL (ref 70–99)
GLUCOSE UR STRIP-MCNC: NEGATIVE MG/DL
HCG SERPL QL: NEGATIVE
HCT VFR BLD AUTO: 41.5 % (ref 35–47)
HGB BLD-MCNC: 13.2 G/DL (ref 11.7–15.7)
HGB UR QL STRIP: NEGATIVE
HOLD SPECIMEN: NORMAL
IMM GRANULOCYTES # BLD: 0 10E3/UL
IMM GRANULOCYTES NFR BLD: 0 %
INR PPP: 0.94 (ref 0.85–1.15)
INTERPRETATION ECG - MUSE: NORMAL
KETONES UR STRIP-MCNC: NEGATIVE MG/DL
LEUKOCYTE ESTERASE UR QL STRIP: NEGATIVE
LYMPHOCYTES # BLD AUTO: 2.1 10E3/UL (ref 0.8–5.3)
LYMPHOCYTES NFR BLD AUTO: 28 %
MCH RBC QN AUTO: 30.3 PG (ref 26.5–33)
MCHC RBC AUTO-ENTMCNC: 31.8 G/DL (ref 31.5–36.5)
MCV RBC AUTO: 95 FL (ref 78–100)
MONOCYTES # BLD AUTO: 0.6 10E3/UL (ref 0–1.3)
MONOCYTES NFR BLD AUTO: 8 %
NEUTROPHILS # BLD AUTO: 4.6 10E3/UL (ref 1.6–8.3)
NEUTROPHILS NFR BLD AUTO: 62 %
NITRATE UR QL: NEGATIVE
NRBC # BLD AUTO: 0 10E3/UL
NRBC BLD AUTO-RTO: 0 /100
P AXIS - MUSE: 61 DEGREES
PH UR STRIP: 6.5 [PH] (ref 5–7)
PLATELET # BLD AUTO: 246 10E3/UL (ref 150–450)
POTASSIUM BLD-SCNC: 3.8 MMOL/L (ref 3.4–5.3)
PR INTERVAL - MUSE: 124 MS
PROT SERPL-MCNC: 7.8 G/DL (ref 6.8–8.8)
QRS DURATION - MUSE: 104 MS
QT - MUSE: 370 MS
QTC - MUSE: 489 MS
R AXIS - MUSE: -19 DEGREES
RBC # BLD AUTO: 4.36 10E6/UL (ref 3.8–5.2)
RBC URINE: 0 /HPF
SALICYLATES SERPL-MCNC: <2 MG/DL
SARS-COV-2 RNA RESP QL NAA+PROBE: NEGATIVE
SODIUM SERPL-SCNC: 140 MMOL/L (ref 133–144)
SP GR UR STRIP: 1.01 (ref 1–1.03)
SQUAMOUS EPITHELIAL: 1 /HPF
SYSTOLIC BLOOD PRESSURE - MUSE: NORMAL MMHG
T AXIS - MUSE: 29 DEGREES
UROBILINOGEN UR STRIP-MCNC: NORMAL MG/DL
VENTRICULAR RATE- MUSE: 105 BPM
WBC # BLD AUTO: 7.5 10E3/UL (ref 4–11)
WBC URINE: <1 /HPF

## 2021-10-22 PROCEDURE — 90791 PSYCH DIAGNOSTIC EVALUATION: CPT

## 2021-10-22 PROCEDURE — 99285 EMERGENCY DEPT VISIT HI MDM: CPT | Mod: 25

## 2021-10-22 PROCEDURE — 84703 CHORIONIC GONADOTROPIN ASSAY: CPT | Performed by: EMERGENCY MEDICINE

## 2021-10-22 PROCEDURE — 80307 DRUG TEST PRSMV CHEM ANLYZR: CPT | Performed by: EMERGENCY MEDICINE

## 2021-10-22 PROCEDURE — 87635 SARS-COV-2 COVID-19 AMP PRB: CPT | Performed by: EMERGENCY MEDICINE

## 2021-10-22 PROCEDURE — 258N000003 HC RX IP 258 OP 636: Performed by: EMERGENCY MEDICINE

## 2021-10-22 PROCEDURE — 96360 HYDRATION IV INFUSION INIT: CPT

## 2021-10-22 PROCEDURE — 82077 ASSAY SPEC XCP UR&BREATH IA: CPT | Performed by: EMERGENCY MEDICINE

## 2021-10-22 PROCEDURE — 93005 ELECTROCARDIOGRAM TRACING: CPT

## 2021-10-22 PROCEDURE — 93005 ELECTROCARDIOGRAM TRACING: CPT | Mod: 76

## 2021-10-22 PROCEDURE — 80053 COMPREHEN METABOLIC PANEL: CPT | Performed by: EMERGENCY MEDICINE

## 2021-10-22 PROCEDURE — 80143 DRUG ASSAY ACETAMINOPHEN: CPT | Performed by: EMERGENCY MEDICINE

## 2021-10-22 PROCEDURE — C9803 HOPD COVID-19 SPEC COLLECT: HCPCS

## 2021-10-22 PROCEDURE — 85610 PROTHROMBIN TIME: CPT | Performed by: EMERGENCY MEDICINE

## 2021-10-22 PROCEDURE — 80179 DRUG ASSAY SALICYLATE: CPT | Performed by: EMERGENCY MEDICINE

## 2021-10-22 PROCEDURE — 36415 COLL VENOUS BLD VENIPUNCTURE: CPT | Performed by: EMERGENCY MEDICINE

## 2021-10-22 PROCEDURE — 81001 URINALYSIS AUTO W/SCOPE: CPT | Performed by: EMERGENCY MEDICINE

## 2021-10-22 PROCEDURE — 85025 COMPLETE CBC W/AUTO DIFF WBC: CPT | Performed by: EMERGENCY MEDICINE

## 2021-10-22 RX ADMIN — SODIUM CHLORIDE 1000 ML: 9 INJECTION, SOLUTION INTRAVENOUS at 10:52

## 2021-10-22 ASSESSMENT — MIFFLIN-ST. JEOR: SCORE: 1531.47
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
## 2021-10-22 NOTE — DISCHARGE INSTRUCTIONS
If I am feeling unsafe or I am in a crisis, I will:  Tell mother or Elan  Contact my established care providers   Call the National Suicide Prevention Lifeline: [phone]   Go to the nearest emergency room   Call 911          Warning signs that I or other people might notice when a crisis is developing for me:   Suicidal thoughts, intent or plan return  Feeling overwhelmed and desperate    Things I am able to do on my own to cope or help me feel better:   Keep appointments as scheduled with providers for tomorrow  Discuss with therapist recent events  Discuss with therapist possible increase in therapy services    Things that I am able to do with others to cope or help me better:  Stay with loved ones until next appointment  Try staying active or busy to distract from depressed thoughts  Practice mindfulness strategies    Things I can use or do for distraction:   Apps:  Calm, Headspace, [id number]  Exercise  Being outdoors    Changes I can make to support my mental health and wellness:   Stay on sleep schedule  Healthy foods  Exercise daily  Practice meditation    People in my life that I can ask for help:   Mother, Elan, therapist    Your [facility] has a mental health crisis team you can call 24/7:   [facility] [phone]    Other things that are important when I m in crisis:  Remember that there are many supports and emergency services available to you    Additional resources and information:   Therapy appointment on 10/23/21 with [facility] therapist   Psychiatry appointment on 10/23/21 with [facility]    Continue with plan of intensive outpatient programming starting in December    CAMMY Curran

## 2021-10-22 NOTE — ED TRIAGE NOTES
"Presents to ED after ingesting 15 benadryl capsules, a \"handful\" of 100mg sertraline pills and \"handful\" of 300mg wellbutrin pills around 1800 10/21 in an attempt to kill herself. Pt states something triggered this, but did not tell this RN. ABCs intact. Pt A&OX4.   "

## 2021-10-22 NOTE — ED NOTES
Spoke to Abebe at poison control to give update. He suggested repeat EKG. Pt cleared by [facility] and will discharge home with [facility] safety plan when pt is medically cleared.

## 2021-10-22 NOTE — ED NOTES
Patient recently diagnosed with bipolar disorder, relationship ended shortly thereafter, pt has been having suicidal ideation for the past week, acted on thoughts yesterday. Belongings checked, placed on FILOMENA agreeable, came to hospital voluntarily

## 2021-10-22 NOTE — ED NOTES
Spoke with Andria with Poison control and it was reported that from their standpoint pt is good to be discharged. Case has been closed.
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
## 2021-10-23 LAB
AMPHETAMINES UR QL SCN: NORMAL
BARBITURATES UR QL: NORMAL
BENZODIAZ UR QL: NORMAL
CANNABINOIDS UR QL SCN: NORMAL
COCAINE UR QL: NORMAL
OPIATES UR QL SCN: NORMAL

## 2021-10-25 LAB
ATRIAL RATE - MUSE: 87 BPM
DIASTOLIC BLOOD PRESSURE - MUSE: NORMAL MMHG
INTERPRETATION ECG - MUSE: NORMAL
P AXIS - MUSE: 62 DEGREES
PR INTERVAL - MUSE: 126 MS
QRS DURATION - MUSE: 98 MS
QT - MUSE: 380 MS
QTC - MUSE: 457 MS
R AXIS - MUSE: -6 DEGREES
SYSTOLIC BLOOD PRESSURE - MUSE: NORMAL MMHG
T AXIS - MUSE: 32 DEGREES
VENTRICULAR RATE- MUSE: 87 BPM